# Patient Record
Sex: FEMALE | URBAN - METROPOLITAN AREA
[De-identification: names, ages, dates, MRNs, and addresses within clinical notes are randomized per-mention and may not be internally consistent; named-entity substitution may affect disease eponyms.]

---

## 2019-06-21 ENCOUNTER — APPOINTMENT (RX ONLY)
Dept: URBAN - METROPOLITAN AREA CLINIC 24 | Facility: CLINIC | Age: 64
Setting detail: DERMATOLOGY
End: 2019-06-21

## 2019-06-21 DIAGNOSIS — L57.3 POIKILODERMA OF CIVATTE: ICD-10-CM

## 2019-06-21 DIAGNOSIS — D22 MELANOCYTIC NEVI: ICD-10-CM

## 2019-06-21 DIAGNOSIS — L82.1 OTHER SEBORRHEIC KERATOSIS: ICD-10-CM

## 2019-06-21 DIAGNOSIS — L73.8 OTHER SPECIFIED FOLLICULAR DISORDERS: ICD-10-CM

## 2019-06-21 DIAGNOSIS — F42.4 EXCORIATION (SKIN-PICKING) DISORDER: ICD-10-CM

## 2019-06-21 DIAGNOSIS — R23.3 SPONTANEOUS ECCHYMOSES: ICD-10-CM

## 2019-06-21 DIAGNOSIS — I78.8 OTHER DISEASES OF CAPILLARIES: ICD-10-CM

## 2019-06-21 DIAGNOSIS — L81.4 OTHER MELANIN HYPERPIGMENTATION: ICD-10-CM

## 2019-06-21 DIAGNOSIS — D18.0 HEMANGIOMA: ICD-10-CM

## 2019-06-21 PROBLEM — D23.39 OTHER BENIGN NEOPLASM OF SKIN OF OTHER PARTS OF FACE: Status: ACTIVE | Noted: 2019-06-21

## 2019-06-21 PROBLEM — D22.5 MELANOCYTIC NEVI OF TRUNK: Status: ACTIVE | Noted: 2019-06-21

## 2019-06-21 PROBLEM — D18.01 HEMANGIOMA OF SKIN AND SUBCUTANEOUS TISSUE: Status: ACTIVE | Noted: 2019-06-21

## 2019-06-21 PROBLEM — S80.921A UNSPECIFIED SUPERFICIAL INJURY OF RIGHT LOWER LEG, INITIAL ENCOUNTER: Status: ACTIVE | Noted: 2019-06-21

## 2019-06-21 PROBLEM — D22.62 MELANOCYTIC NEVI OF LEFT UPPER LIMB, INCLUDING SHOULDER: Status: ACTIVE | Noted: 2019-06-21

## 2019-06-21 PROBLEM — F41.9 ANXIETY DISORDER, UNSPECIFIED: Status: ACTIVE | Noted: 2019-06-21

## 2019-06-21 PROBLEM — D48.5 NEOPLASM OF UNCERTAIN BEHAVIOR OF SKIN: Status: ACTIVE | Noted: 2019-06-21

## 2019-06-21 PROCEDURE — 11102 TANGNTL BX SKIN SINGLE LES: CPT

## 2019-06-21 PROCEDURE — 99203 OFFICE O/P NEW LOW 30 MIN: CPT | Mod: 25

## 2019-06-21 PROCEDURE — ? BIOPSY BY SHAVE METHOD

## 2019-06-21 PROCEDURE — ? COUNSELING

## 2019-06-21 PROCEDURE — 11103 TANGNTL BX SKIN EA SEP/ADDL: CPT

## 2019-06-21 ASSESSMENT — LOCATION DETAILED DESCRIPTION DERM
LOCATION DETAILED: LEFT LATERAL MALAR CHEEK
LOCATION DETAILED: RIGHT MEDIAL TRAPEZIAL NECK
LOCATION DETAILED: RIGHT LATERAL ABDOMEN
LOCATION DETAILED: RIGHT SUPERIOR MEDIAL BUCCAL CHEEK
LOCATION DETAILED: LEFT POSTERIOR SHOULDER
LOCATION DETAILED: LEFT MID-UPPER BACK
LOCATION DETAILED: RIGHT INFERIOR UPPER BACK
LOCATION DETAILED: LEFT INFERIOR ANTERIOR NECK
LOCATION DETAILED: LEFT SUPERIOR NASAL CHEEK
LOCATION DETAILED: LEFT SUPERIOR MEDIAL FOREHEAD
LOCATION DETAILED: RIGHT ACHILLES SKIN
LOCATION DETAILED: UPPER STERNUM
LOCATION DETAILED: RIGHT MEDIAL MALAR CHEEK
LOCATION DETAILED: LEFT DISTAL PRETIBIAL REGION
LOCATION DETAILED: RIGHT ANTERIOR DISTAL UPPER ARM
LOCATION DETAILED: LEFT SUPERIOR CENTRAL BUCCAL CHEEK
LOCATION DETAILED: RIGHT ANTERIOR PROXIMAL THIGH
LOCATION DETAILED: RIGHT CENTRAL MALAR CHEEK
LOCATION DETAILED: RIGHT LATERAL SUPERIOR CHEST
LOCATION DETAILED: LEFT DISTAL CALF
LOCATION DETAILED: RIGHT ANTERIOR DISTAL THIGH
LOCATION DETAILED: RIGHT MID-UPPER BACK
LOCATION DETAILED: RIGHT PROXIMAL PRETIBIAL REGION
LOCATION DETAILED: RIGHT POSTERIOR ANKLE
LOCATION DETAILED: LEFT ANTERIOR DISTAL THIGH
LOCATION DETAILED: LEFT SUPERIOR LATERAL UPPER BACK
LOCATION DETAILED: RIGHT INFERIOR LATERAL MIDBACK
LOCATION DETAILED: RIGHT INFERIOR MEDIAL MALAR CHEEK
LOCATION DETAILED: RIGHT SUPERIOR CENTRAL MALAR CHEEK
LOCATION DETAILED: RIGHT SUPERIOR LATERAL UPPER BACK
LOCATION DETAILED: LEFT DISTAL POSTERIOR THIGH
LOCATION DETAILED: LEFT POSTERIOR ANKLE
LOCATION DETAILED: LEFT SUPERIOR UPPER BACK
LOCATION DETAILED: EPIGASTRIC SKIN
LOCATION DETAILED: LEFT INFERIOR CENTRAL MALAR CHEEK
LOCATION DETAILED: LEFT INFERIOR UPPER BACK
LOCATION DETAILED: RIGHT DISTAL CALF
LOCATION DETAILED: LEFT ANTERIOR PROXIMAL THIGH
LOCATION DETAILED: RIGHT DISTAL POSTERIOR THIGH
LOCATION DETAILED: LEFT CENTRAL MALAR CHEEK

## 2019-06-21 ASSESSMENT — LOCATION SIMPLE DESCRIPTION DERM
LOCATION SIMPLE: LEFT FOREHEAD
LOCATION SIMPLE: LEFT THIGH
LOCATION SIMPLE: RIGHT ANKLE
LOCATION SIMPLE: LEFT CHEEK
LOCATION SIMPLE: LEFT UPPER BACK
LOCATION SIMPLE: RIGHT ACHILLES SKIN
LOCATION SIMPLE: RIGHT CHEEK
LOCATION SIMPLE: RIGHT THIGH
LOCATION SIMPLE: RIGHT UPPER BACK
LOCATION SIMPLE: POSTERIOR NECK
LOCATION SIMPLE: RIGHT BACK
LOCATION SIMPLE: LEFT SHOULDER
LOCATION SIMPLE: ABDOMEN
LOCATION SIMPLE: RIGHT UPPER ARM
LOCATION SIMPLE: LEFT CALF
LOCATION SIMPLE: RIGHT POSTERIOR THIGH
LOCATION SIMPLE: RIGHT CALF
LOCATION SIMPLE: LEFT PRETIBIAL REGION
LOCATION SIMPLE: CHEST
LOCATION SIMPLE: LEFT ANKLE
LOCATION SIMPLE: RIGHT LOWER BACK
LOCATION SIMPLE: LEFT POSTERIOR THIGH
LOCATION SIMPLE: RIGHT PRETIBIAL REGION
LOCATION SIMPLE: LEFT ANTERIOR NECK

## 2019-06-21 ASSESSMENT — LOCATION ZONE DERM
LOCATION ZONE: TRUNK
LOCATION ZONE: ARM
LOCATION ZONE: NECK
LOCATION ZONE: LEG
LOCATION ZONE: FACE

## 2019-06-21 NOTE — PROCEDURE: BIOPSY BY SHAVE METHOD
Body Location Override (Optional - Billing Will Still Be Based On Selected Body Map Location If Applicable): right lateral chest
Was A Bandage Applied: Yes
Post-Care Instructions: I reviewed with the patient in detail post-care instructions. Patient is to keep the biopsy site dry overnight, and then apply bacitracin twice daily until healed. Patient may apply hydrogen peroxide soaks to remove any crusting.
Dressing: bandage
Electrodesiccation And Curettage Text: The wound bed was treated with electrodesiccation and curettage after the biopsy was performed.
Anesthesia Volume In Cc: 0.5
Biopsy Type: H and E
Billing Type: Third-Party Bill
Bill For Surgical Tray: no
Notification Instructions: Patient will be notified of biopsy results. However, patient instructed to call the office if not contacted within 2 weeks.
Curettage Text: The wound bed was treated with curettage after the biopsy was performed.
Detail Level: Detailed
Biopsy Method: Dermablade
Hemostasis: Aluminum Chloride
Silver Nitrate Text: The wound bed was treated with silver nitrate after the biopsy was performed.
Lab Facility: [id number]
Additional Anesthesia Volume In Cc (Will Not Render If 0): 0
Cryotherapy Text: The wound bed was treated with cryotherapy after the biopsy was performed.
Wound Care: Vaseline
Anesthesia Type: 1% lidocaine with epinephrine
Consent: Written consent was obtained and risks were reviewed including but not limited to scarring, infection, bleeding, scabbing, incomplete removal, nerve damage and allergy to anesthesia.
Lab: [id number]
Type Of Destruction Used: Curettage
Electrodesiccation Text: The wound bed was treated with electrodesiccation after the biopsy was performed.
Depth Of Biopsy: dermis

## 2019-07-09 ENCOUNTER — APPOINTMENT (RX ONLY)
Dept: URBAN - METROPOLITAN AREA CLINIC 24 | Facility: CLINIC | Age: 64
Setting detail: DERMATOLOGY
End: 2019-07-09

## 2019-07-09 PROBLEM — C44.519 BASAL CELL CARCINOMA OF SKIN OF OTHER PART OF TRUNK: Status: ACTIVE | Noted: 2019-07-09

## 2019-07-09 PROCEDURE — 17262 DSTRJ MAL LES T/A/L 1.1-2.0: CPT

## 2019-07-09 PROCEDURE — ? COUNSELING

## 2019-07-09 PROCEDURE — ? CURETTAGE AND DESTRUCTION

## 2019-07-09 NOTE — PROCEDURE: CURETTAGE AND DESTRUCTION
Add Ability To Document Additional Intralesional Injection: No
Size Of Lesion After Curettage: 1.1
Additional Information: (Optional): The wound was cleaned, and a pressure dressing was applied.  The patient received detailed post-op instructions.
Post-Care Instructions: I reviewed with the patient in detail post-care instructions. Patient is to keep the area dry for 48 hours, and not to engage in any swimming until the area is healed. Should the patient develop any fevers, chills, bleeding, severe pain patient will contact the office immediately.
Bill As A Line Item Or As Units: Line Item
What Was Performed First?: Curettage
Biopsy Photograph Reviewed: Yes
Anesthesia Volume In Cc: 5
Detail Level: Detailed
Total Volume (Ccs): 1
Size Of Lesion In Cm: 0.9
Anesthesia Type: 1% lidocaine with epinephrine
Number Of Curettages: 3
Cautery Type: electrodesiccation
Consent was obtained from the patient. The risks, benefits and alternatives to therapy were discussed in detail. Specifically, the risks of infection, scarring, bleeding, prolonged wound healing, nerve injury, incomplete removal, allergy to anesthesia and recurrence were addressed. Alternatives to ED&C, such as: surgical removal and XRT were also discussed.  Prior to the procedure, the treatment site was clearly identified and confirmed by the patient. All components of Universal Protocol/PAUSE Rule completed.

## 2019-07-18 ENCOUNTER — APPOINTMENT (RX ONLY)
Dept: URBAN - METROPOLITAN AREA CLINIC 5 | Facility: CLINIC | Age: 64
Setting detail: DERMATOLOGY
End: 2019-07-18

## 2019-07-18 PROBLEM — C44.311 BASAL CELL CARCINOMA OF SKIN OF NOSE: Status: ACTIVE | Noted: 2019-07-18

## 2019-07-18 PROCEDURE — 14060 TIS TRNFR E/N/E/L 10 SQ CM/<: CPT | Mod: 79

## 2019-07-18 PROCEDURE — ? MOHS SURGERY

## 2019-07-18 PROCEDURE — 17311 MOHS 1 STAGE H/N/HF/G: CPT | Mod: 79

## 2019-07-18 NOTE — PROCEDURE: MOHS SURGERY
Consent Type: Consent 1 (Standard)
Consent 3/Introductory Paragraph: I gave the patient a chance to ask questions they had about the procedure.  Following this I explained the Mohs procedure and consent was obtained. The risks, benefits and alternatives to therapy were discussed in detail. Specifically, the risks of infection, scarring, bleeding, prolonged wound healing, incomplete removal, allergy to anesthesia, nerve injury and recurrence were addressed. Prior to the procedure, the treatment site was clearly identified and confirmed by the patient.
Show Mohs Case Number Variable: Yes
Quadrant Reporting?: no
Scc In Situ Histology Text: Full thickness squamous atypia noted and marked accordingly on the Mohs map.
Crescentic Advancement Flap Text: The defect edges were debeveled with a #15 scalpel blade.  Given the location of the defect and the proximity to free margins a crescentic advancement flap was deemed most appropriate.  Using a sterile surgical marker, the appropriate advancement flap was drawn incorporating the defect and placing the expected incisions within the relaxed skin tension lines where possible.    The area thus outlined was incised deep to adipose tissue with a #15 scalpel blade.  The skin margins were undermined to an appropriate distance in all directions utilizing iris scissors.
Stage 10: Additional Anesthesia Volume In Cc: 0
Oculoplastic Surgeon Procedure Text (C): After obtaining clear surgical margins the patient was sent to oculoplastics for surgical repair.  The patient understands they will receive post-surgical care and follow-up from the referring physician's office.
S Plasty Text: Given the location and shape of the defect, and the orientation of relaxed skin tension lines, an S-plasty was deemed most appropriate for repair.  Using a sterile surgical marker, the appropriate outline of the S-plasty was drawn, incorporating the defect and placing the expected incisions within the relaxed skin tension lines where possible.  The area thus outlined was incised deep to adipose tissue with a #15 scalpel blade.  The skin margins were undermined to an appropriate distance in all directions utilizing iris scissors. The skin flaps were advanced over the defect.  The opposing margins were then approximated with interrupted buried subcutaneous sutures.
Bcc Superficial Histology Text: Basaloid atypia in a superficial pattern noted and marked accordingly on the Mohs map.
Suturegard Retention Suture: 2-0 Nylon
Stage 2: Additional Anesthesia Type: 1% lidocaine with epinephrine
Helical Rim Advancement Flap Text: The defect edges were debeveled with a #15 blade scalpel.  Given the location of the defect and the proximity to free margins (helical rim) a double helical rim advancement flap was deemed most appropriate.  Using a sterile surgical marker, the appropriate advancement flaps were drawn incorporating the defect and placing the expected incisions between the helical rim and antihelix where possible.  The area thus outlined was incised through and through with a #15 scalpel blade.  With a skin hook and iris scissors, the flaps were gently and sharply undermined and freed up.
Wound Care (No Sutures): Petrolatum
Stage 1: Number Of Blocks?: 1
Dermal Autograft Text: The defect edges were debeveled with a #15 scalpel blade.  Given the location of the defect, shape of the defect and the proximity to free margins a dermal autograft was deemed most appropriate.  Using a sterile surgical marker, the primary defect shape was transferred to the donor site. The area thus outlined was incised deep to adipose tissue with a #15 scalpel blade.  The harvested graft was then trimmed of adipose and epidermal tissue until only dermis was left.  The skin graft was then placed in the primary defect and oriented appropriately.
Initial Size Of Lesion: 0.7
Lazy S Intermediate Repair Preamble Text (Leave Blank If You Do Not Want): Undermining was performed with blunt dissection.
Provider Procedure Text (F): After obtaining clear surgical margins the defect was repaired by another provider.
Consent (Near Eyelid Margin)/Introductory Paragraph: The rationale for Mohs was explained to the patient and consent was obtained. The risks, benefits and alternatives to therapy were discussed in detail. Specifically, the risks of ectropion or eyelid deformity, infection, scarring, bleeding, prolonged wound healing, incomplete removal, allergy to anesthesia, nerve injury and recurrence were addressed. Prior to the procedure, the treatment site was clearly identified and confirmed by the patient.
Same Histology In Subsequent Stages Text: The pattern and morphology of the tumor is as described in the first stage.
O-Z Flap Text: The defect edges were debeveled with a #15 scalpel blade.  Given the location of the defect, shape of the defect and the proximity to free margins an O-Z flap was deemed most appropriate.  Using a sterile surgical marker, an appropriate transposition flap was drawn incorporating the defect and placing the expected incisions within the relaxed skin tension lines where possible. The area thus outlined was incised deep to adipose tissue with a #15 scalpel blade.  The skin margins were undermined to an appropriate distance in all directions utilizing iris scissors.
Mid-Level Procedure Text (C): After obtaining clear surgical margins the patient was sent to a mid-level provider for surgical repair.  The patient understands they will receive post-surgical care and follow-up from the mid-level provider.
Z Plasty Text: The lesion was extirpated to the level of the fat with a #15 scalpel blade.  Given the location of the defect, shape of the defect and the proximity to free margins a Z-plasty was deemed most appropriate for repair.  Using a sterile surgical marker, the appropriate transposition arms of the Z-plasty were drawn incorporating the defect and placing the expected incisions within the relaxed skin tension lines where possible.    The area thus outlined was incised deep to adipose tissue with a #15 scalpel blade.  The skin margins were undermined to an appropriate distance in all directions utilizing iris scissors.  The opposing transposition arms were then transposed into place in opposite direction and anchored with interrupted buried subcutaneous sutures.
Suture Removal: 7 days
Length To Time In Minutes Device Was In Place: 10
Graft Cartilage Fenestration Text: The cartilage was fenestrated with a 2mm punch biopsy to help facilitate graft survival and healing.
Bilobed Flap Text: The defect edges were debeveled with a #15 scalpel blade.  Given the location of the defect and the proximity to free margins a bilobe flap was deemed most appropriate.  Using a sterile surgical marker, an appropriate bilobe flap drawn around the defect.    The area thus outlined was incised deep to adipose tissue with a #15 scalpel blade.  The skin margins were undermined to an appropriate distance in all directions utilizing iris scissors.
Primary Defect Width In Cm (Final Defect Size - Required For Flaps/Grafts): 0.9
Purse String (Simple) Text: Given the location of the defect and the characteristics of the surrounding skin a pursestring closure was deemed most appropriate.  Undermining was performed circumfirentially around the surgical defect.  A purstring suture was then placed and tightened.
Mercedes Flap Text: The defect edges were debeveled with a #15 scalpel blade.  Given the location of the defect, shape of the defect and the proximity to free margins a Mercedes flap was deemed most appropriate.  Using a sterile surgical marker, an appropriate advancement flap was drawn incorporating the defect and placing the expected incisions within the relaxed skin tension lines where possible. The area thus outlined was incised deep to adipose tissue with a #15 scalpel blade.  The skin margins were undermined to an appropriate distance in all directions utilizing iris scissors.
Area L Indication Text: Tumors in this location are included in Area L (trunk and extremities).  Mohs surgery is indicated for larger tumors, or tumors with aggressive histologic features, in these anatomic locations.
Melolabial Interpolation Flap Text: A decision was made to reconstruct the defect utilizing an interpolation axial flap and a staged reconstruction.  A telfa template was made of the defect.  This telfa template was then used to outline the melolabial interpolation flap.  The donor area for the pedicle flap was then injected with anesthesia.  The flap was excised through the skin and subcutaneous tissue down to the layer of the underlying musculature.  The pedicle flap was carefully excised within this deep plane to maintain its blood supply.  The edges of the donor site were undermined.   The donor site was closed in a primary fashion.  The pedicle was then rotated into position and sutured.  Once the tube was sutured into place, adequate blood supply was confirmed with blanching and refill.  The pedicle was then wrapped with xeroform gauze and dressed appropriately with a telfa and gauze bandage to ensure continued blood supply and protect the attached pedicle.
Consent (Scalp)/Introductory Paragraph: The rationale for Mohs was explained to the patient and consent was obtained. The risks, benefits and alternatives to therapy were discussed in detail. Specifically, the risks of changes in hair growth pattern secondary to repair, infection, scarring, bleeding, prolonged wound healing, incomplete removal, allergy to anesthesia, nerve injury and recurrence were addressed. Prior to the procedure, the treatment site was clearly identified and confirmed by the patient.
Mohs Histo Method Verbiage: Each section was then chromacoded and processed in the Mohs lab using the Mohs protocol and submitted for frozen section.
Mastoid Interpolation Flap Text: A decision was made to reconstruct the defect utilizing an interpolation axial flap and a staged reconstruction.  A telfa template was made of the defect.  This telfa template was then used to outline the mastoid interpolation flap.  The donor area for the pedicle flap was then injected with anesthesia.  The flap was excised through the skin and subcutaneous tissue down to the layer of the underlying musculature.  The pedicle flap was carefully excised within this deep plane to maintain its blood supply.  The edges of the donor site were undermined.   The donor site was closed in a primary fashion.  The pedicle was then rotated into position and sutured.  Once the tube was sutured into place, adequate blood supply was confirmed with blanching and refill.  The pedicle was then wrapped with xeroform gauze and dressed appropriately with a telfa and gauze bandage to ensure continued blood supply and protect the attached pedicle.
Modified Advancement Flap Text: The defect edges were debeveled with a #15 scalpel blade.  Given the location of the defect, shape of the defect and the proximity to free margins a modified advancement flap was deemed most appropriate.  Using a sterile surgical marker, an appropriate advancement flap was drawn incorporating the defect and placing the expected incisions within the relaxed skin tension lines where possible.    The area thus outlined was incised deep to adipose tissue with a #15 scalpel blade.  The skin margins were undermined to an appropriate distance in all directions utilizing iris scissors.
Referred To Plastics For Closure Text (Leave Blank If You Do Not Want): After obtaining clear surgical margins the patient was sent to plastics for surgical repair.  The patient understands they will receive post-surgical care and follow-up from the referring physician's office.
Scc Well Differentiated Histology Text: Well-differentiated squamous atypia noted and marked accordingly on the Mohs map.
Epidermal Closure Graft Donor Site (Optional): simple interrupted
Island Pedicle Flap With Canthal Suspension Text: The defect edges were debeveled with a #15 scalpel blade.  Given the location of the defect, shape of the defect and the proximity to free margins an island pedicle advancement flap was deemed most appropriate.  Using a sterile surgical marker, an appropriate advancement flap was drawn incorporating the defect, outlining the appropriate donor tissue and placing the expected incisions within the relaxed skin tension lines where possible. The area thus outlined was incised deep to adipose tissue with a #15 scalpel blade.  The skin margins were undermined to an appropriate distance in all directions around the primary defect and laterally outward around the island pedicle utilizing iris scissors.  There was minimal undermining beneath the pedicle flap. A suspension suture was placed in the canthal tendon to prevent tension and prevent ectropion.
Tarsorrhaphy Text: A tarsorrhaphy was performed using Frost sutures.
Spiral Flap Text: The defect edges were debeveled with a #15 scalpel blade.  Given the location of the defect, shape of the defect and the proximity to free margins a spiral flap was deemed most appropriate.  Using a sterile surgical marker, an appropriate rotation flap was drawn incorporating the defect and placing the expected incisions within the relaxed skin tension lines where possible. The area thus outlined was incised deep to adipose tissue with a #15 scalpel blade.  The skin margins were undermined to an appropriate distance in all directions utilizing iris scissors.
Bcc Infiltrative Histology Text: There were numerous aggregates of basaloid cells demonstrating an infiltrative pattern.
Asc Procedure Text (A): After obtaining clear surgical margins the patient was sent to an ASC for surgical repair.  The patient understands they will receive post-surgical care and follow-up from the ASC physician.
Cheiloplasty (Complex) Text: A decision was made to reconstruct the defect with a  cheiloplasty.  The defect was undermined extensively.  Additional obicularis oris muscle was excised with a 15 blade scalpel.  The defect was converted into a full thickness wedge to facilite a better cosmetic result.  Small vessels were then tied off with 5-0 monocyrl. The obicularis oris, superficial fascia, adipose and dermis were then reapproximated.  After the deeper layers were approximated the epidermis was reapproximated with particular care given to realign the vermillion border.
Otolaryngologist Procedure Text (B): After obtaining clear surgical margins the patient was sent to otolaryngology for surgical repair.  The patient understands they will receive post-surgical care and follow-up from the referring physician's office.
Mohs Rapid Report Verbiage: The area of clinically evident tumor was marked with skin marking ink and appropriately hatched.  The initial incision was made following the Mohs approach through the skin.  The specimen was taken to the lab, divided into the necessary number of pieces, chromacoded and processed according to the Mohs protocol.  This was repeated in successive stages until a tumor free defect was achieved.
Medical Necessity Statement: Based on my medical judgement, Mohs surgery is the most appropriate treatment for this cancer compared to other treatments.
Secondary Defect Length In Cm (Required For Flaps): 2
Keystone Flap Text: The defect edges were debeveled with a #15 scalpel blade.  Given the location of the defect, shape of the defect a keystone flap was deemed most appropriate.  Using a sterile surgical marker, an appropriate keystone flap was drawn incorporating the defect, outlining the appropriate donor tissue and placing the expected incisions within the relaxed skin tension lines where possible. The area thus outlined was incised deep to adipose tissue with a #15 scalpel blade.  The skin margins were undermined to an appropriate distance in all directions around the primary defect and laterally outward around the flap utilizing iris scissors.
Previous Accession (Optional): [id number]
Advancement Flap (Single) Text: The defect edges were debeveled with a #15 scalpel blade.  Given the location of the defect and the proximity to free margins a single advancement flap was deemed most appropriate.  Using a sterile surgical marker, an appropriate advancement flap was drawn incorporating the defect and placing the expected incisions within the relaxed skin tension lines where possible.    The area thus outlined was incised deep to adipose tissue with a #15 scalpel blade.  The skin margins were undermined to an appropriate distance in all directions utilizing iris scissors.
Split-Thickness Skin Graft Text: The defect edges were debeveled with a #15 scalpel blade.  Given the location of the defect, shape of the defect and the proximity to free margins a split thickness skin graft was deemed most appropriate.  Using a sterile surgical marker, the primary defect shape was transferred to the donor site. The split thickness graft was then harvested.  The skin graft was then placed in the primary defect and oriented appropriately.
Suturegard Intro: Intraoperative tissue expansion was performed, utilizing the SUTUREGARD device, in order to reduce wound tension.
Manual Repair Warning Statement: We plan on removing the manually selected variable below in favor of our much easier automatic structured text blocks found in the previous tab. We decided to do this to help make the flow better and give you the full power of structured data. Manual selection is never going to be ideal in our platform and I would encourage you to avoid using manual selection from this point on, especially since I will be sunsetting this feature. It is important that you do one of two things with the customized text below. First, you can save all of the text in a word file so you can have it for future reference. Second, transfer the text to the appropriate area in the Library tab. Lastly, if there is a flap or graft type which we do not have you need to let us know right away so I can add it in before the variable is hidden. No need to panic, we plan to give you roughly 6 months to make the change.
Lazy S Complex Repair Preamble Text (Leave Blank If You Do Not Want): Extensive wide undermining was performed.
Melolabial Transposition Flap Text: The defect edges were debeveled with a #15 scalpel blade.  Given the location of the defect and the proximity to free margins a melolabial flap was deemed most appropriate.  Using a sterile surgical marker, an appropriate melolabial transposition flap was drawn incorporating the defect.    The area thus outlined was incised deep to adipose tissue with a #15 scalpel blade.  The skin margins were undermined to an appropriate distance in all directions utilizing iris scissors.
Suturegard Body: The suture ends were repeatedly re-tightened and re-clamped to achieve the desired tissue expansion.
Rhombic Flap Text: The defect edges were debeveled with a #15 scalpel blade.  Given the location of the defect and the proximity to free margins a rhombic flap was deemed most appropriate.  Using a sterile surgical marker, an appropriate rhombic flap was drawn incorporating the defect.    The area thus outlined was incised deep to adipose tissue with a #15 scalpel blade.  The skin margins were undermined to an appropriate distance in all directions utilizing iris scissors.
Cartilage Graft Text: The defect edges were debeveled with a #15 scalpel blade.  Given the location of the defect, shape of the defect, the fact the defect involved a full thickness cartilage defect a cartilage graft was deemed most appropriate.  An appropriate donor site was identified, cleansed, and anesthetized. The cartilage graft was then harvested and transferred to the recipient site, oriented appropriately and then sutured into place.  The secondary defect was then repaired using a primary closure.
A-T Advancement Flap Text: The defect edges were debeveled with a #15 scalpel blade.  Given the location of the defect, shape of the defect and the proximity to free margins an A-T advancement flap was deemed most appropriate.  Using a sterile surgical marker, an appropriate advancement flap was drawn incorporating the defect and placing the expected incisions within the relaxed skin tension lines where possible.    The area thus outlined was incised deep to adipose tissue with a #15 scalpel blade.  The skin margins were undermined to an appropriate distance in all directions utilizing iris scissors.
V-Y Plasty Text: The defect edges were debeveled with a #15 scalpel blade.  Given the location of the defect, shape of the defect and the proximity to free margins an V-Y advancement flap was deemed most appropriate.  Using a sterile surgical marker, an appropriate advancement flap was drawn incorporating the defect and placing the expected incisions within the relaxed skin tension lines where possible.    The area thus outlined was incised deep to adipose tissue with a #15 scalpel blade.  The skin margins were undermined to an appropriate distance in all directions utilizing iris scissors.
Consent (Temporal Branch)/Introductory Paragraph: The rationale for Mohs was explained to the patient and consent was obtained. The risks, benefits and alternatives to therapy were discussed in detail. Specifically, the risks of damage to the temporal branch of the facial nerve, infection, scarring, bleeding, prolonged wound healing, incomplete removal, allergy to anesthesia, and recurrence were addressed. Prior to the procedure, the treatment site was clearly identified and confirmed by the patient.
Bcc Superficial Pigmented Histology Text: Basaloid atypia in a superficial pattern with pigmentation noted and marked accordingly on the Mohs map.
Bilateral Helical Rim Advancement Flap Text: The defect edges were debeveled with a #15 blade scalpel.  Given the location of the defect and the proximity to free margins (helical rim) a bilateral helical rim advancement flap was deemed most appropriate.  Using a sterile surgical marker, the appropriate advancement flaps were drawn incorporating the defect and placing the expected incisions between the helical rim and antihelix where possible.  The area thus outlined was incised through and through with a #15 scalpel blade.  With a skin hook and iris scissors, the flaps were gently and sharply undermined and freed up.
Skin Substitute Text: The defect edges were debeveled with a #15 scalpel blade.  Given the location of the defect, shape of the defect and the proximity to free margins a skin substitute graft was deemed most appropriate.  The graft material was trimmed to fit the size of the defect. The graft was then placed in the primary defect and oriented appropriately.
X Size Of Lesion In Cm (Optional): 0.8
Basosquamous Cell Carcinoma Histology Text: Cellular atypia with basosquamous differentiation noted and marked accordingly on the Mohs map.
Consent (Ear)/Introductory Paragraph: The rationale for Mohs was explained to the patient and consent was obtained. The risks, benefits and alternatives to therapy were discussed in detail. Specifically, the risks of ear deformity, infection, scarring, bleeding, prolonged wound healing, incomplete removal, allergy to anesthesia, nerve injury and recurrence were addressed. Prior to the procedure, the treatment site was clearly identified and confirmed by the patient.
Eye Protection Verbiage: Before proceeding with the stage, a plastic scleral shield was inserted. The globe was anesthetized with a few drops of 1% lidocaine with 1:100,000 epinephrine. Then, an appropriate sized scleral shield was chosen and coated with lacrilube ointment. The shield was gently inserted and left in place for the duration of each stage. After the stage was completed, the shield was gently removed.
No Residual Tumor Seen Histology Text: There were no malignant cells seen in the sections examined.
Double O-Z Flap Text: The defect edges were debeveled with a #15 scalpel blade.  Given the location of the defect, shape of the defect and the proximity to free margins a Double O-Z flap was deemed most appropriate.  Using a sterile surgical marker, an appropriate transposition flap was drawn incorporating the defect and placing the expected incisions within the relaxed skin tension lines where possible. The area thus outlined was incised deep to adipose tissue with a #15 scalpel blade.  The skin margins were undermined to an appropriate distance in all directions utilizing iris scissors.
Cheek Interpolation Flap Text: A decision was made to reconstruct the defect utilizing an interpolation axial flap and a staged reconstruction.  A telfa template was made of the defect.  This telfa template was then used to outline the Cheek Interpolation flap.  The donor area for the pedicle flap was then injected with anesthesia.  The flap was excised through the skin and subcutaneous tissue down to the layer of the underlying musculature.  The interpolation flap was carefully excised within this deep plane to maintain its blood supply.  The edges of the donor site were undermined.   The donor site was closed in a primary fashion.  The pedicle was then rotated into position and sutured.  Once the tube was sutured into place, adequate blood supply was confirmed with blanching and refill.  The pedicle was then wrapped with xeroform gauze and dressed appropriately with a telfa and gauze bandage to ensure continued blood supply and protect the attached pedicle.
Epidermal Sutures: 6-0 Nylon
Secondary Intention Text (Leave Blank If You Do Not Want): The defect will heal with secondary intention.
Bilobed Transposition Flap Text: The defect edges were debeveled with a #15 scalpel blade.  Given the location of the defect and the proximity to free margins a bilobed transposition flap was deemed most appropriate.  Using a sterile surgical marker, an appropriate bilobe flap drawn around the defect.    The area thus outlined was incised deep to adipose tissue with a #15 scalpel blade.  The skin margins were undermined to an appropriate distance in all directions utilizing iris scissors.
Repair Type: Flap
Incidental Superficial Basal Cell Carcinoma Histology Text: basaloid atypia in a superficial pattern was noted and marked accordingly on the Mohs map.
Detail Level: Detailed
Purse String (Intermediate) Text: Given the location of the defect and the characteristics of the surrounding skin a pursestring intermediate closure was deemed most appropriate.  Undermining was performed circumfirentially around the surgical defect.  A purstring suture was then placed and tightened.
Incidental Squamous Cell Carcinoma In Situ Histology Text: full thickness squamous atypia was noted and marked accordingly on the Mohs map.
Anesthesia Volume In Cc: 7
Mucosal Advancement Flap Text: Given the location of the defect, shape of the defect and the proximity to free margins a mucosal advancement flap was deemed most appropriate. Incisions were made with a 15 blade scalpel in the appropriate fashion along the cutaneous vermillion border and the mucosal lip. The remaining actinically damaged mucosal tissue was excised.  The mucosal advancement flap was then elevated to the gingival sulcus with care taken to preserve the neurovascular structures and advanced into the primary defect. Care was taken to ensure that precise realignment of the vermillion border was achieved.
Hemostasis: Electrocautery
Posterior Auricular Interpolation Flap Text: A decision was made to reconstruct the defect utilizing an interpolation axial flap and a staged reconstruction.  A telfa template was made of the defect.  This telfa template was then used to outline the posterior auricular interpolation flap.  The donor area for the pedicle flap was then injected with anesthesia.  The flap was excised through the skin and subcutaneous tissue down to the layer of the underlying musculature.  The pedicle flap was carefully excised within this deep plane to maintain its blood supply.  The edges of the donor site were undermined.   The donor site was closed in a primary fashion.  The pedicle was then rotated into position and sutured.  Once the tube was sutured into place, adequate blood supply was confirmed with blanching and refill.  The pedicle was then wrapped with xeroform gauze and dressed appropriately with a telfa and gauze bandage to ensure continued blood supply and protect the attached pedicle.
Unna Boot Text: An Unna boot was placed to help immobilize the limb and facilitate more rapid healing.
Scc Moderately Differentiated Histology Text: Squamous atypia with moderate differentiation noted and marked accordingly on the Mohs map.
Alar Island Pedicle Flap Text: The defect edges were debeveled with a #15 scalpel blade.  Given the location of the defect, shape of the defect and the proximity to the alar rim an island pedicle advancement flap was deemed most appropriate.  Using a sterile surgical marker, an appropriate advancement flap was drawn incorporating the defect, outlining the appropriate donor tissue and placing the expected incisions within the nasal ala running parallel to the alar rim. The area thus outlined was incised with a #15 scalpel blade.  The skin margins were undermined minimally to an appropriate distance in all directions around the primary defect and laterally outward around the island pedicle utilizing iris scissors.  There was minimal undermining beneath the pedicle flap.
Complex Repair And Flap Additional Text (Will Appearing After The Standard Complex Repair Text): The complex repair was not sufficient to completely close the primary defect. The remaining additional defect was repaired with the flap mentioned below.
Ear Wedge Repair Text: A wedge excision was completed by carrying down an excision through the full thickness of the ear and cartilage with an inward facing Burow's triangle. The wound was then closed in a layered fashion.
Star Wedge Flap Text: The defect edges were debeveled with a #15 scalpel blade.  Given the location of the defect, shape of the defect and the proximity to free margins a star wedge flap was deemed most appropriate.  Using a sterile surgical marker, an appropriate rotation flap was drawn incorporating the defect and placing the expected incisions within the relaxed skin tension lines where possible. The area thus outlined was incised deep to adipose tissue with a #15 scalpel blade.  The skin margins were undermined to an appropriate distance in all directions utilizing iris scissors.
Alternatives Discussed Intro (Do Not Add Period): I discussed alternative treatments to Mohs surgery and specifically discussed the risks and benefits of
O-T Plasty Text: The defect edges were debeveled with a #15 scalpel blade.  Given the location of the defect, shape of the defect and the proximity to free margins an O-T plasty was deemed most appropriate.  Using a sterile surgical marker, an appropriate O-T plasty was drawn incorporating the defect and placing the expected incisions within the relaxed skin tension lines where possible.    The area thus outlined was incised deep to adipose tissue with a #15 scalpel blade.  The skin margins were undermined to an appropriate distance in all directions utilizing iris scissors.
Secondary Defect Width In Cm (Required For Flaps): 1.2
Surgeon: Max
Advancement Flap (Double) Text: The defect edges were debeveled with a #15 scalpel blade.  Given the location of the defect and the proximity to free margins a double advancement flap was deemed most appropriate.  Using a sterile surgical marker, the appropriate advancement flaps were drawn incorporating the defect and placing the expected incisions within the relaxed skin tension lines where possible.    The area thus outlined was incised deep to adipose tissue with a #15 scalpel blade.  The skin margins were undermined to an appropriate distance in all directions utilizing iris scissors.
Bcc  Nodular Histology Text: Basaloid atypia in a nodular pattern noted and marked accordingly on the Mohs map.
Closure 2 Information: This tab is for additional flaps and grafts, including complex repair and grafts and complex repair and flaps. You can also specify a different location for the additional defect, if the location is the same you do not need to select a new one. We will insert the automated text for the repair you select below just as we do for solitary flaps and grafts. Please note that at this time if you select a location with a different insurance zone you will need to override the ICD10 and CPT if appropriate.
Dressing: pressure dressing with telfa
Rhomboid Transposition Flap Text: The defect edges were debeveled with a #15 scalpel blade.  Given the location of the defect and the proximity to free margins a rhomboid transposition flap was deemed most appropriate.  Using a sterile surgical marker, an appropriate rhomboid flap was drawn incorporating the defect.    The area thus outlined was incised deep to adipose tissue with a #15 scalpel blade.  The skin margins were undermined to an appropriate distance in all directions utilizing iris scissors.
Composite Graft Text: The defect edges were debeveled with a #15 scalpel blade.  Given the location of the defect, shape of the defect, the proximity to free margins and the fact the defect was full thickness a composite graft was deemed most appropriate.  The defect was outline and then transferred to the donor site.  A full thickness graft was then excised from the donor site. The graft was then placed in the primary defect, oriented appropriately and then sutured into place.  The secondary defect was then repaired using a primary closure.
Donor Site Anesthesia Type: same as repair anesthesia
O-T Advancement Flap Text: The defect edges were debeveled with a #15 scalpel blade.  Given the location of the defect, shape of the defect and the proximity to free margins an O-T advancement flap was deemed most appropriate.  Using a sterile surgical marker, an appropriate advancement flap was drawn incorporating the defect and placing the expected incisions within the relaxed skin tension lines where possible.    The area thus outlined was incised deep to adipose tissue with a #15 scalpel blade.  The skin margins were undermined to an appropriate distance in all directions utilizing iris scissors.
H Plasty Text: Given the location of the defect, shape of the defect and the proximity to free margins a H-plasty was deemed most appropriate for repair.  Using a sterile surgical marker, the appropriate advancement arms of the H-plasty were drawn incorporating the defect and placing the expected incisions within the relaxed skin tension lines where possible. The area thus outlined was incised deep to adipose tissue with a #15 scalpel blade. The skin margins were undermined to an appropriate distance in all directions utilizing iris scissors.  The opposing advancement arms were then advanced into place in opposite direction and anchored with interrupted buried subcutaneous sutures.
Consent (Marginal Mandibular)/Introductory Paragraph: The rationale for Mohs was explained to the patient and consent was obtained. The risks, benefits and alternatives to therapy were discussed in detail. Specifically, the risks of damage to the marginal mandibular branch of the facial nerve, infection, scarring, bleeding, prolonged wound healing, incomplete removal, allergy to anesthesia, and recurrence were addressed. Prior to the procedure, the treatment site was clearly identified and confirmed by the patient.
Mixed Superficial And Nodular Bcc Histology Text: Basaloid atypia in a superficial and nodular pattern noted and marked accordingly on the Mohs map.
Dressing (No Sutures): dry sterile dressing
Retention Suture Bite Size: 3 mm
Tissue Cultured Epidermal Autograft Text: The defect edges were debeveled with a #15 scalpel blade.  Given the location of the defect, shape of the defect and the proximity to free margins a tissue cultured epidermal autograft was deemed most appropriate.  The graft was then trimmed to fit the size of the defect.  The graft was then placed in the primary defect and oriented appropriately.
Repair Anesthesia Method: local infiltration
Inflammation Suggestive Of Cancer Camouflage Histology Text: There was a dense lymphocytic infiltrate which prevented adequate histologic evaluation of adjacent structures.
Ear Star Wedge Flap Text: The defect edges were debeveled with a #15 blade scalpel.  Given the location of the defect and the proximity to free margins (helical rim) an ear star wedge flap was deemed most appropriate.  Using a sterile surgical marker, the appropriate flap was drawn incorporating the defect and placing the expected incisions between the helical rim and antihelix where possible.  The area thus outlined was incised through and through with a #15 scalpel blade.
Consent (Nose)/Introductory Paragraph: The rationale for Mohs was explained to the patient and consent was obtained. The risks, benefits and alternatives to therapy were discussed in detail. Specifically, the risks of nasal deformity, changes in the flow of air through the nose, infection, scarring, bleeding, prolonged wound healing, incomplete removal, allergy to anesthesia, nerve injury and recurrence were addressed. Prior to the procedure, the treatment site was clearly identified and confirmed by the patient.
Mohs Method Verbiage: An incision at a 45 degree angle following the standard Mohs approach was done and the specimen was harvested as a microscopic controlled layer.
V-Y Flap Text: The defect edges were debeveled with a #15 scalpel blade.  Given the location of the defect, shape of the defect and the proximity to free margins a V-Y flap was deemed most appropriate.  Using a sterile surgical marker, an appropriate advancement flap was drawn incorporating the defect and placing the expected incisions within the relaxed skin tension lines where possible.    The area thus outlined was incised deep to adipose tissue with a #15 scalpel blade.  The skin margins were undermined to an appropriate distance in all directions utilizing iris scissors.
Pain Refusal Text: I offered to prescribe pain medication but the patient refused to take this medication.
Closure 3 Information: This tab is for additional flaps and grafts above and beyond our usual structured repairs.  Please note if you enter information here it will not currently bill and you will need to add the billing information manually.
Anesthesia Volume In Cc: 6
Cheek-To-Nose Interpolation Flap Text: A decision was made to reconstruct the defect utilizing an interpolation axial flap and a staged reconstruction.  A telfa template was made of the defect.  This telfa template was then used to outline the Cheek-To-Nose Interpolation flap.  The donor area for the pedicle flap was then injected with anesthesia.  The flap was excised through the skin and subcutaneous tissue down to the layer of the underlying musculature.  The interpolation flap was carefully excised within this deep plane to maintain its blood supply.  The edges of the donor site were undermined.   The donor site was closed in a primary fashion.  The pedicle was then rotated into position and sutured.  Once the tube was sutured into place, adequate blood supply was confirmed with blanching and refill.  The pedicle was then wrapped with xeroform gauze and dressed appropriately with a telfa and gauze bandage to ensure continued blood supply and protect the attached pedicle.
Area H Indication Text: Tumors in this location are included in Area H (eyelids, eyebrows, nose, lips, chin, ear, pre-auricular, post-auricular, temple, genitalia, hands, feet, ankles and areola).  Tissue conservation is critical in these anatomic locations.
Trilobed Flap Text: The defect edges were debeveled with a #15 scalpel blade.  Given the location of the defect and the proximity to free margins a trilobed flap was deemed most appropriate.  Using a sterile surgical marker, an appropriate trilobed flap drawn around the defect.    The area thus outlined was incised deep to adipose tissue with a #15 scalpel blade.  The skin margins were undermined to an appropriate distance in all directions utilizing iris scissors.
Partial Purse String (Simple) Text: Given the location of the defect and the characteristics of the surrounding skin a simple purse string closure was deemed most appropriate.  Undermining was performed circumfirentially around the surgical defect.  A purse string suture was then placed and tightened. Wound tension only allowed a partial closure of the circular defect.
No Repair - Repaired With Adjacent Surgical Defect Text (Leave Blank If You Do Not Want): After obtaining clear surgical margins the defect was repaired concurrently with another surgical defect which was in close approximation.
Partial Purse String (Intermediate) Text: Given the location of the defect and the characteristics of the surrounding skin an intermediate purse string closure was deemed most appropriate.  Undermining was performed circumfirentially around the surgical defect.  A purse string suture was then placed and tightened. Wound tension only allowed a partial closure of the circular defect.
Dorsal Nasal Flap Text: The defect edges were debeveled with a #15 scalpel blade.  Given the location of the defect and the proximity to free margins a dorsal nasal flap was deemed most appropriate.  Using a sterile surgical marker, an appropriate dorsal nasal flap was drawn around the defect.    The area thus outlined was incised deep to adipose tissue with a #15 scalpel blade.  The skin margins were undermined to an appropriate distance in all directions utilizing iris scissors.
Incidental Actinic Keratosis Histology Text: actinic damage was noted in the histology.
Epidermal Closure: running subcuticular
Hatchet Flap Text: The defect edges were debeveled with a #15 scalpel blade.  Given the location of the defect, shape of the defect and the proximity to free margins a hatchet flap was deemed most appropriate.  Using a sterile surgical marker, an appropriate hatchet flap was drawn incorporating the defect and placing the expected incisions within the relaxed skin tension lines where possible.    The area thus outlined was incised deep to adipose tissue with a #15 scalpel blade.  The skin margins were undermined to an appropriate distance in all directions utilizing iris scissors.
Paramedian Forehead Flap Text: A decision was made to reconstruct the defect utilizing an interpolation axial flap and a staged reconstruction.  A telfa template was made of the defect.  This telfa template was then used to outline the paramedian forehead pedicle flap.  The donor area for the pedicle flap was then injected with anesthesia.  The flap was excised through the skin and subcutaneous tissue down to the layer of the underlying musculature.  The pedicle flap was carefully excised within this deep plane to maintain its blood supply.  The edges of the donor site were undermined.   The donor site was closed in a primary fashion.  The pedicle was then rotated into position and sutured.  Once the tube was sutured into place, adequate blood supply was confirmed with blanching and refill.  The pedicle was then wrapped with xeroform gauze and dressed appropriately with a telfa and gauze bandage to ensure continued blood supply and protect the attached pedicle.
Mohs Case Number: [id number]
Double Island Pedicle Flap Text: The defect edges were debeveled with a #15 scalpel blade.  Given the location of the defect, shape of the defect and the proximity to free margins a double island pedicle advancement flap was deemed most appropriate.  Using a sterile surgical marker, an appropriate advancement flap was drawn incorporating the defect, outlining the appropriate donor tissue and placing the expected incisions within the relaxed skin tension lines where possible.    The area thus outlined was incised deep to adipose tissue with a #15 scalpel blade.  The skin margins were undermined to an appropriate distance in all directions around the primary defect and laterally outward around the island pedicle utilizing iris scissors.  There was minimal undermining beneath the pedicle flap.
Scc Poorly Differentiated Histology Text: Squamous atypia with poor differentiation noted and marked accordingly on the Mohs map.
Flap Type: Transposition Flap
Home Suture Removal Text: Patient was provided instructions on removing sutures and will remove their sutures at home.  If they have any questions or difficulties they will call the office.
Location Indication Override (Is Already Calculated Based On Selected Body Location): Area H
Graft Donor Site Bandage (Optional-Leave Blank If You Don't Want In Note): Steri-strips and a pressure bandage were applied to the donor site.
Complex Repair And Graft Additional Text (Will Appearing After The Standard Complex Repair Text): The complex repair was not sufficient to completely close the primary defect. The remaining additional defect was repaired with the graft mentioned below.
Transposition Flap Text: The defect edges were debeveled with a #15 scalpel blade.  Given the location of the defect and the proximity to free margins a transposition flap was deemed most appropriate.  Using a sterile surgical marker, an appropriate transposition flap was drawn incorporating the defect.    The area thus outlined was incised deep to adipose tissue with a #15 scalpel blade.  The skin margins were undermined to an appropriate distance in all directions utilizing iris scissors.
Full Thickness Lip Wedge Repair (Flap) Text: Given the location of the defect and the proximity to free margins a full thickness wedge repair was deemed most appropriate.  Using a sterile surgical marker, the appropriate repair was drawn incorporating the defect and placing the expected incisions perpendicular to the vermillion border.  The vermillion border was also meticulously outlined to ensure appropriate reapproximation during the repair.  The area thus outlined was incised through and through with a #15 scalpel blade.  The muscularis and dermis were reaproximated with deep sutures following hemostasis. Care was taken to realign the vermillion border before proceeding with the superficial closure.  Once the vermillion was realigned the superfical and mucosal closure was finished.
Consent 1/Introductory Paragraph: The rationale for Mohs was explained to the patient and consent was obtained. The risks, benefits and alternatives to therapy were discussed in detail. Specifically, the risks of infection, scarring, bleeding, prolonged wound healing, incomplete removal, allergy to anesthesia, nerve injury and recurrence were addressed. Prior to the procedure, the treatment site was clearly identified and confirmed by the patient.
Deep Sutures: 5-0 Maxon
Burow's Advancement Flap Text: The defect edges were debeveled with a #15 scalpel blade.  Given the location of the defect and the proximity to free margins a Burow's advancement flap was deemed most appropriate.  Using a sterile surgical marker, the appropriate advancement flap was drawn incorporating the defect and placing the expected incisions within the relaxed skin tension lines where possible.    The area thus outlined was incised deep to adipose tissue with a #15 scalpel blade.  The skin margins were undermined to an appropriate distance in all directions utilizing iris scissors.
O-Z Plasty Text: The defect edges were debeveled with a #15 scalpel blade.  Given the location of the defect, shape of the defect and the proximity to free margins an O-Z plasty (double transposition flap) was deemed most appropriate.  Using a sterile surgical marker, the appropriate transposition flaps were drawn incorporating the defect and placing the expected incisions within the relaxed skin tension lines where possible.    The area thus outlined was incised deep to adipose tissue with a #15 scalpel blade.  The skin margins were undermined to an appropriate distance in all directions utilizing iris scissors.  Hemostasis was achieved with electrocautery.  The flaps were then transposed into place, one clockwise and the other counterclockwise, and anchored with interrupted buried subcutaneous sutures.
Chonodrocutaneous Helical Advancement Flap Text: The defect edges were debeveled with a #15 scalpel blade.  Given the location of the defect and the proximity to free margins a chondrocutaneous helical advancement flap was deemed most appropriate.  Using a sterile surgical marker, the appropriate advancement flap was drawn incorporating the defect and placing the expected incisions within the relaxed skin tension lines where possible.    The area thus outlined was incised deep to adipose tissue with a #15 scalpel blade.  The skin margins were undermined to an appropriate distance in all directions utilizing iris scissors.
Bcc Pigmented Histology Text: Basaloid atypia with pigmentation noted and marked accordingly on the Mohs map.
Double O-Z Plasty Text: The defect edges were debeveled with a #15 scalpel blade.  Given the location of the defect, shape of the defect and the proximity to free margins a Double O-Z plasty (double transposition flap) was deemed most appropriate.  Using a sterile surgical marker, the appropriate transposition flaps were drawn incorporating the defect and placing the expected incisions within the relaxed skin tension lines where possible. The area thus outlined was incised deep to adipose tissue with a #15 scalpel blade.  The skin margins were undermined to an appropriate distance in all directions utilizing iris scissors.  Hemostasis was achieved with electrocautery.  The flaps were then transposed into place, one clockwise and the other counterclockwise, and anchored with interrupted buried subcutaneous sutures.
Bi-Rhombic Flap Text: The defect edges were debeveled with a #15 scalpel blade.  Given the location of the defect and the proximity to free margins a bi-rhombic flap was deemed most appropriate.  Using a sterile surgical marker, an appropriate rhombic flap was drawn incorporating the defect. The area thus outlined was incised deep to adipose tissue with a #15 scalpel blade.  The skin margins were undermined to an appropriate distance in all directions utilizing iris scissors.
Epidermal Autograft Text: The defect edges were debeveled with a #15 scalpel blade.  Given the location of the defect, shape of the defect and the proximity to free margins an epidermal autograft was deemed most appropriate.  Using a sterile surgical marker, the primary defect shape was transferred to the donor site. The epidermal graft was then harvested.  The skin graft was then placed in the primary defect and oriented appropriately.
W Plasty Text: The lesion was extirpated to the level of the fat with a #15 scalpel blade.  Given the location of the defect, shape of the defect and the proximity to free margins a W-plasty was deemed most appropriate for repair.  Using a sterile surgical marker, the appropriate transposition arms of the W-plasty were drawn incorporating the defect and placing the expected incisions within the relaxed skin tension lines where possible.    The area thus outlined was incised deep to adipose tissue with a #15 scalpel blade.  The skin margins were undermined to an appropriate distance in all directions utilizing iris scissors.  The opposing transposition arms were then transposed into place in opposite direction and anchored with interrupted buried subcutaneous sutures.
Information: Selecting Yes will display possible errors in your note based on the variables you have selected. This validation is only offered as a suggestion for you. PLEASE NOTE THAT THE VALIDATION TEXT WILL BE REMOVED WHEN YOU FINALIZE YOUR NOTE. IF YOU WANT TO FAX A PRELIMINARY NOTE YOU WILL NEED TO TOGGLE THIS TO 'NO' IF YOU DO NOT WANT IT IN YOUR FAXED NOTE.
Consent (Spinal Accessory)/Introductory Paragraph: The rationale for Mohs was explained to the patient and consent was obtained. The risks, benefits and alternatives to therapy were discussed in detail. Specifically, the risks of damage to the spinal accessory nerve, infection, scarring, bleeding, prolonged wound healing, incomplete removal, allergy to anesthesia, and recurrence were addressed. Prior to the procedure, the treatment site was clearly identified and confirmed by the patient.
O-L Flap Text: The defect edges were debeveled with a #15 scalpel blade.  Given the location of the defect, shape of the defect and the proximity to free margins an O-L flap was deemed most appropriate.  Using a sterile surgical marker, an appropriate advancement flap was drawn incorporating the defect and placing the expected incisions within the relaxed skin tension lines where possible.    The area thus outlined was incised deep to adipose tissue with a #15 scalpel blade.  The skin margins were undermined to an appropriate distance in all directions utilizing iris scissors.
Mixed Nodular And Infiltrative Bcc Histology Text: Basaloid atypia in an infiltrative and nodular pattern noted and marked accordingly on the Mohs map.
Non-Graft Cartilage Fenestration Text: The cartilage was fenestrated with a 2mm punch biopsy to help facilitate healing.
Banner Transposition Flap Text: The defect edges were debeveled with a #15 scalpel blade.  Given the location of the defect and the proximity to free margins a Banner transposition flap was deemed most appropriate.  Using a sterile surgical marker, an appropriate flap drawn around the defect. The area thus outlined was incised deep to adipose tissue with a #15 scalpel blade.  The skin margins were undermined to an appropriate distance in all directions utilizing iris scissors.
Xenograft Text: The defect edges were debeveled with a #15 scalpel blade.  Given the location of the defect, shape of the defect and the proximity to free margins a xenograft was deemed most appropriate.  The graft was then trimmed to fit the size of the defect.  The graft was then placed in the primary defect and oriented appropriately.
Mauc Instructions: By selecting yes to the question below the MAUC number will be added into the note.  This will be calculated automatically based on the diagnosis chosen, the size entered, the body zone selected (H,M,L) and the specific indications you chose. You will also have the option to override the Mohs AUC if you disagree with the automatically calculated number and this option is found in the Case Summary tab.
Advancement-Rotation Flap Text: The defect edges were debeveled with a #15 scalpel blade.  Given the location of the defect, shape of the defect and the proximity to free margins an advancement-rotation flap was deemed most appropriate.  Using a sterile surgical marker, an appropriate flap was drawn incorporating the defect and placing the expected incisions within the relaxed skin tension lines where possible. The area thus outlined was incised deep to adipose tissue with a #15 scalpel blade.  The skin margins were undermined to an appropriate distance in all directions utilizing iris scissors.
Interpolation Flap Text: A decision was made to reconstruct the defect utilizing an interpolation axial flap and a staged reconstruction.  A telfa template was made of the defect.  This telfa template was then used to outline the interpolation flap.  The donor area for the pedicle flap was then injected with anesthesia.  The flap was excised through the skin and subcutaneous tissue down to the layer of the underlying musculature.  The interpolation flap was carefully excised within this deep plane to maintain its blood supply.  The edges of the donor site were undermined.   The donor site was closed in a primary fashion.  The pedicle was then rotated into position and sutured.  Once the tube was sutured into place, adequate blood supply was confirmed with blanching and refill.  The pedicle was then wrapped with xeroform gauze and dressed appropriately with a telfa and gauze bandage to ensure continued blood supply and protect the attached pedicle.
Area M Indication Text: Tumors in this location are included in Area M (cheek, forehead, scalp, neck, jawline and pretibial skin).  Mohs surgery is indicated for tumors in these anatomic locations.
Consent (Lip)/Introductory Paragraph: The rationale for Mohs was explained to the patient and consent was obtained. The risks, benefits and alternatives to therapy were discussed in detail. Specifically, the risks of lip deformity, changes in the oral aperture, infection, scarring, bleeding, prolonged wound healing, incomplete removal, allergy to anesthesia, nerve injury and recurrence were addressed. Prior to the procedure, the treatment site was clearly identified and confirmed by the patient.
Surgeon/Pathologist Verbiage (Will Incorporate Name Of Surgeon From Intro If Not Blank): operated in two distinct and integrated capacities as the surgeon and pathologist.
Scc Histology Text: Squamous atypia noted and marked accordingly on the Mohs map.
Localized Dermabrasion With Wire Brush Text: The patient was draped in routine manner.  Localized dermabrasion using 3 x 17 mm wire brush was performed in routine manner to papillary dermis. This spot dermabrasion is being performed to complete skin cancer reconstruction. It also will eliminate the other sun damaged precancerous cells that are known to be part of the regional effect of a lifetime's worth of sun exposure. This localized dermabrasion is therapeutic and should not be considered cosmetic in any regard.
Island Pedicle Flap Text: The defect edges were debeveled with a #15 scalpel blade.  Given the location of the defect, shape of the defect and the proximity to free margins an island pedicle advancement flap was deemed most appropriate.  Using a sterile surgical marker, an appropriate advancement flap was drawn incorporating the defect, outlining the appropriate donor tissue and placing the expected incisions within the relaxed skin tension lines where possible.    The area thus outlined was incised deep to adipose tissue with a #15 scalpel blade.  The skin margins were undermined to an appropriate distance in all directions around the primary defect and laterally outward around the island pedicle utilizing iris scissors.  There was minimal undermining beneath the pedicle flap.
Subsequent Stages Histo Method Verbiage: Using a similar technique to that described above, a thin layer of tissue was removed from all areas where tumor was visible on the previous stage.  The tissue was again oriented, mapped, dyed, and processed as above.
Rotation Flap Text: The defect edges were debeveled with a #15 scalpel blade.  Given the location of the defect, shape of the defect and the proximity to free margins a rotation flap was deemed most appropriate.  Using a sterile surgical marker, an appropriate rotation flap was drawn incorporating the defect and placing the expected incisions within the relaxed skin tension lines where possible.    The area thus outlined was incised deep to adipose tissue with a #15 scalpel blade.  The skin margins were undermined to an appropriate distance in all directions utilizing iris scissors.
Bcc Histology Text: There were numerous aggregates of basaloid cells.
Cheiloplasty (Less Than 50%) Text: A decision was made to reconstruct the defect with a  cheiloplasty.  The defect was undermined extensively.  Additional obicularis oris muscle was excised with a 15 blade scalpel.  The defect was converted into a full thickness wedge, of less than 50% of the vertical height of the lip, to facilite a better cosmetic result.  Small vessels were then tied off with 5-0 monocyrl. The obicularis oris, superficial fascia, adipose and dermis were then reapproximated.  After the deeper layers were approximated the epidermis was reapproximated with particular care given to realign the vermillion border.
Estimated Blood Loss (Cc): minimal
Date Of Previous Biopsy (Optional): 6/21/19
Wound Care: Vaseline
Island Pedicle Flap-Requiring Vessel Identification Text: The defect edges were debeveled with a #15 scalpel blade.  Given the location of the defect, shape of the defect and the proximity to free margins an island pedicle advancement flap was deemed most appropriate.  Using a sterile surgical marker, an appropriate advancement flap was drawn, based on the axial vessel mentioned above, incorporating the defect, outlining the appropriate donor tissue and placing the expected incisions within the relaxed skin tension lines where possible.    The area thus outlined was incised deep to adipose tissue with a #15 scalpel blade.  The skin margins were undermined to an appropriate distance in all directions around the primary defect and laterally outward around the island pedicle utilizing iris scissors.  There was minimal undermining beneath the pedicle flap.
Muscle Hinge Flap Text: The defect edges were debeveled with a #15 scalpel blade.  Given the size, depth and location of the defect and the proximity to free margins a muscle hinge flap was deemed most appropriate.  Using a sterile surgical marker, an appropriate hinge flap was drawn incorporating the defect. The area thus outlined was incised with a #15 scalpel blade.  The skin margins were undermined to an appropriate distance in all directions utilizing iris scissors.
Ftsg Text: The defect edges were debeveled with a #15 scalpel blade.  Given the location of the defect, shape of the defect and the proximity to free margins a full thickness skin graft was deemed most appropriate.  Using a sterile surgical marker, the primary defect shape was transferred to the donor site. The area thus outlined was incised deep to adipose tissue with a #15 scalpel blade.  The harvested graft was then trimmed of adipose tissue until only dermis and epidermis was left.  The skin margins of the secondary defect were undermined to an appropriate distance in all directions utilizing iris scissors.  The secondary defect was closed with interrupted buried subcutaneous sutures.  The skin edges were then re-apposed with running  sutures.  The skin graft was then placed in the primary defect and oriented appropriately.
Postop Diagnosis: same
Post-Care Instructions: I reviewed with the patient in detail post-care instructions. Patient is not to engage in any heavy lifting, exercise, or swimming for the next 14 days. Should the patient develop any fevers, chills, bleeding, severe pain patient will contact the office immediately.
Referring Physician (Optional): ana
Consent 2/Introductory Paragraph: Mohs surgery was explained to the patient and consent was obtained. The risks, benefits and alternatives to therapy were discussed in detail. Specifically, the risks of infection, scarring, bleeding, prolonged wound healing, incomplete removal, allergy to anesthesia, nerve injury and recurrence were addressed. Prior to the procedure, the treatment site was clearly identified and confirmed by the patient.

## 2019-07-26 ENCOUNTER — APPOINTMENT (RX ONLY)
Dept: URBAN - METROPOLITAN AREA CLINIC 5 | Facility: CLINIC | Age: 64
Setting detail: DERMATOLOGY
End: 2019-07-26

## 2019-07-26 DIAGNOSIS — Z48.02 ENCOUNTER FOR REMOVAL OF SUTURES: ICD-10-CM

## 2019-07-26 PROCEDURE — 99024 POSTOP FOLLOW-UP VISIT: CPT

## 2019-07-26 PROCEDURE — ? SUTURE REMOVAL (GLOBAL PERIOD)

## 2019-07-26 ASSESSMENT — LOCATION DETAILED DESCRIPTION DERM: LOCATION DETAILED: LEFT NASAL ALA

## 2019-07-26 ASSESSMENT — LOCATION ZONE DERM: LOCATION ZONE: NOSE

## 2019-07-26 ASSESSMENT — LOCATION SIMPLE DESCRIPTION DERM: LOCATION SIMPLE: LEFT NOSE

## 2019-07-26 NOTE — PROCEDURE: SUTURE REMOVAL (GLOBAL PERIOD)
Detail Level: Detailed
Add 93972 Cpt? (Important Note: In 2017 The Use Of 98294 Is Being Tracked By Cms To Determine Future Global Period Reimbursement For Global Periods): yes

## 2020-02-19 ENCOUNTER — APPOINTMENT (RX ONLY)
Dept: URBAN - METROPOLITAN AREA CLINIC 24 | Facility: CLINIC | Age: 65
Setting detail: DERMATOLOGY
End: 2020-02-19

## 2020-02-19 DIAGNOSIS — L73.8 OTHER SPECIFIED FOLLICULAR DISORDERS: ICD-10-CM

## 2020-02-19 DIAGNOSIS — D18.0 HEMANGIOMA: ICD-10-CM

## 2020-02-19 DIAGNOSIS — D22 MELANOCYTIC NEVI: ICD-10-CM

## 2020-02-19 DIAGNOSIS — L57.0 ACTINIC KERATOSIS: ICD-10-CM

## 2020-02-19 DIAGNOSIS — L81.4 OTHER MELANIN HYPERPIGMENTATION: ICD-10-CM

## 2020-02-19 DIAGNOSIS — B07.8 OTHER VIRAL WARTS: ICD-10-CM

## 2020-02-19 DIAGNOSIS — L82.1 OTHER SEBORRHEIC KERATOSIS: ICD-10-CM

## 2020-02-19 DIAGNOSIS — Z85.828 PERSONAL HISTORY OF OTHER MALIGNANT NEOPLASM OF SKIN: ICD-10-CM

## 2020-02-19 PROBLEM — D22.5 MELANOCYTIC NEVI OF TRUNK: Status: ACTIVE | Noted: 2020-02-19

## 2020-02-19 PROBLEM — D48.5 NEOPLASM OF UNCERTAIN BEHAVIOR OF SKIN: Status: ACTIVE | Noted: 2020-02-19

## 2020-02-19 PROBLEM — D22.39 MELANOCYTIC NEVI OF OTHER PARTS OF FACE: Status: ACTIVE | Noted: 2020-02-19

## 2020-02-19 PROBLEM — D22.61 MELANOCYTIC NEVI OF RIGHT UPPER LIMB, INCLUDING SHOULDER: Status: ACTIVE | Noted: 2020-02-19

## 2020-02-19 PROBLEM — D18.01 HEMANGIOMA OF SKIN AND SUBCUTANEOUS TISSUE: Status: ACTIVE | Noted: 2020-02-19

## 2020-02-19 PROCEDURE — ? LIQUID NITROGEN

## 2020-02-19 PROCEDURE — ? OBSERVATION

## 2020-02-19 PROCEDURE — ? BIOPSY BY SHAVE METHOD

## 2020-02-19 PROCEDURE — 11103 TANGNTL BX SKIN EA SEP/ADDL: CPT

## 2020-02-19 PROCEDURE — 17000 DESTRUCT PREMALG LESION: CPT | Mod: 59

## 2020-02-19 PROCEDURE — 11102 TANGNTL BX SKIN SINGLE LES: CPT | Mod: 59

## 2020-02-19 PROCEDURE — 99214 OFFICE O/P EST MOD 30 MIN: CPT | Mod: 25

## 2020-02-19 PROCEDURE — ? COUNSELING

## 2020-02-19 PROCEDURE — 17003 DESTRUCT PREMALG LES 2-14: CPT | Mod: 59

## 2020-02-19 PROCEDURE — 17110 DESTRUCTION B9 LES UP TO 14: CPT

## 2020-02-19 ASSESSMENT — LOCATION DETAILED DESCRIPTION DERM
LOCATION DETAILED: EPIGASTRIC SKIN
LOCATION DETAILED: LEFT LATERAL KNEE
LOCATION DETAILED: RIGHT ANTECUBITAL SKIN
LOCATION DETAILED: LEFT INFERIOR FOREHEAD
LOCATION DETAILED: RIGHT MEDIAL BREAST 12-1:00 REGION
LOCATION DETAILED: RIGHT SUPERIOR UPPER BACK
LOCATION DETAILED: LEFT INFERIOR LATERAL FOREHEAD
LOCATION DETAILED: LEFT INFERIOR MEDIAL FOREHEAD
LOCATION DETAILED: SUPERIOR THORACIC SPINE
LOCATION DETAILED: LEFT INFRAMAMMARY CREASE (INNER QUADRANT)
LOCATION DETAILED: LEFT MEDIAL FOREHEAD
LOCATION DETAILED: RIGHT ANTERIOR SHOULDER
LOCATION DETAILED: LEFT FOREHEAD
LOCATION DETAILED: RIGHT FOREHEAD
LOCATION DETAILED: SUBXIPHOID
LOCATION DETAILED: RIGHT NASAL SIDEWALL
LOCATION DETAILED: LEFT SUPERIOR UPPER BACK
LOCATION DETAILED: RIGHT INFERIOR UPPER BACK
LOCATION DETAILED: RIGHT MID-UPPER BACK
LOCATION DETAILED: RIGHT SUBMANDIBULAR AREA
LOCATION DETAILED: RIGHT SUPERIOR MEDIAL FOREHEAD
LOCATION DETAILED: LEFT POSTERIOR SHOULDER
LOCATION DETAILED: LEFT SUPERIOR LATERAL UPPER BACK
LOCATION DETAILED: LOWER STERNUM
LOCATION DETAILED: LEFT LATERAL FOREHEAD
LOCATION DETAILED: RIGHT RIB CAGE
LOCATION DETAILED: SUPERIOR MID FOREHEAD
LOCATION DETAILED: RIGHT SUPERIOR LATERAL UPPER BACK
LOCATION DETAILED: LEFT MID-UPPER BACK
LOCATION DETAILED: RIGHT ANTERIOR PROXIMAL THIGH

## 2020-02-19 ASSESSMENT — LOCATION SIMPLE DESCRIPTION DERM
LOCATION SIMPLE: LEFT FOREHEAD
LOCATION SIMPLE: LEFT KNEE
LOCATION SIMPLE: RIGHT UPPER BACK
LOCATION SIMPLE: ABDOMEN
LOCATION SIMPLE: SUPERIOR FOREHEAD
LOCATION SIMPLE: RIGHT UPPER ARM
LOCATION SIMPLE: RIGHT BREAST
LOCATION SIMPLE: RIGHT BACK
LOCATION SIMPLE: RIGHT SUBMANDIBULAR AREA
LOCATION SIMPLE: RIGHT THIGH
LOCATION SIMPLE: RIGHT NOSE
LOCATION SIMPLE: LEFT SHOULDER
LOCATION SIMPLE: RIGHT SHOULDER
LOCATION SIMPLE: LEFT UPPER BACK
LOCATION SIMPLE: UPPER BACK
LOCATION SIMPLE: LEFT BREAST
LOCATION SIMPLE: RIGHT FOREHEAD
LOCATION SIMPLE: CHEST

## 2020-02-19 ASSESSMENT — LOCATION ZONE DERM
LOCATION ZONE: LEG
LOCATION ZONE: NOSE
LOCATION ZONE: FACE
LOCATION ZONE: TRUNK
LOCATION ZONE: ARM

## 2020-02-19 NOTE — PROCEDURE: LIQUID NITROGEN
Post-Care Instructions: I reviewed with the patient in detail post-care instructions. Patient is to wear sunprotection, and avoid picking at any of the treated lesions. Pt may apply Vaseline to crusted or scabbing areas.
Render Post-Care Instructions In Note?: no
Consent: The patient's consent was obtained including but not limited to risks of crusting, scabbing, blistering, scarring, darker or lighter pigmentary change, recurrence, incomplete removal and infection.
Detail Level: Detailed
Medical Necessity Clause: This procedure was medically necessary because the lesions that were treated were:
Medical Necessity Information: It is in your best interest to select a reason for this procedure from the list below. All of these items fulfill various CMS LCD requirements except the new and changing color options.
Duration Of Freeze Thaw-Cycle (Seconds): 0

## 2020-02-19 NOTE — PROCEDURE: BIOPSY BY SHAVE METHOD
Body Location Override (Optional - Billing Will Still Be Based On Selected Body Map Location If Applicable): left mid clavicle
Detail Level: Detailed
Depth Of Biopsy: dermis
Was A Bandage Applied: Yes
Size Of Lesion In Cm: 0
Biopsy Type: H and E
Biopsy Method: Dermablade
Anesthesia Type: 1% lidocaine with epinephrine
Anesthesia Volume In Cc: 0.5
Hemostasis: Drysol
Wound Care: Petrolatum
Dressing: bandage
Destruction After The Procedure: No
Type Of Destruction Used: Curettage
Curettage Text: The wound bed was treated with curettage after the biopsy was performed.
Cryotherapy Text: The wound bed was treated with cryotherapy after the biopsy was performed.
Electrodesiccation Text: The wound bed was treated with electrodesiccation after the biopsy was performed.
Electrodesiccation And Curettage Text: The wound bed was treated with electrodesiccation and curettage after the biopsy was performed.
Silver Nitrate Text: The wound bed was treated with silver nitrate after the biopsy was performed.
Lab: [id number]
Lab Facility: [id number]
Consent: Written consent was obtained and risks were reviewed including but not limited to scarring, infection, bleeding, scabbing, incomplete removal, nerve damage and allergy to anesthesia.
Post-Care Instructions: I reviewed with the patient in detail post-care instructions. Patient is to keep the biopsy site dry overnight, and then apply bacitracin twice daily until healed. Patient may apply hydrogen peroxide soaks to remove any crusting.
Notification Instructions: Patient will be notified of biopsy results. However, patient instructed to call the office if not contacted within 2 weeks.
Billing Type: Third-Party Bill
Body Location Override (Optional - Billing Will Still Be Based On Selected Body Map Location If Applicable): left lateral clavicle

## 2020-03-04 ENCOUNTER — APPOINTMENT (RX ONLY)
Dept: URBAN - METROPOLITAN AREA CLINIC 24 | Facility: CLINIC | Age: 65
Setting detail: DERMATOLOGY
End: 2020-03-04

## 2020-03-04 DIAGNOSIS — Z71.89 OTHER SPECIFIED COUNSELING: ICD-10-CM

## 2020-03-04 PROBLEM — C44.619 BASAL CELL CARCINOMA OF SKIN OF LEFT UPPER LIMB, INCLUDING SHOULDER: Status: ACTIVE | Noted: 2020-03-04

## 2020-03-04 PROCEDURE — 17262 DSTRJ MAL LES T/A/L 1.1-2.0: CPT

## 2020-03-04 PROCEDURE — ? COUNSELING

## 2020-03-04 PROCEDURE — ? SUNSCREEN RECOMMENDATIONS

## 2020-03-04 PROCEDURE — ? CURETTAGE AND DESTRUCTION

## 2020-03-04 NOTE — PROCEDURE: MIPS QUALITY
Quality 402: Tobacco Use And Help With Quitting Among Adolescents: Patient screened for tobacco and is an ex-smoker
Quality 431: Preventive Care And Screening: Unhealthy Alcohol Use - Screening: Patient screened for unhealthy alcohol use using a single question and scores less than 2 times per year
Quality 110: Preventive Care And Screening: Influenza Immunization: Influenza Immunization previously received during influenza season
Detail Level: Detailed
Quality 111:Pneumonia Vaccination Status For Older Adults: Pneumococcal Vaccination not Administered or Previously Received, Reason not Otherwise Specified
Quality 226: Preventive Care And Screening: Tobacco Use: Screening And Cessation Intervention: Patient screened for tobacco use and is an ex/non-smoker

## 2020-03-04 NOTE — PROCEDURE: CURETTAGE AND DESTRUCTION
Body Location Override (Optional - Billing Will Still Be Based On Selected Body Map Location If Applicable): left lateral clavicle
Detail Level: Detailed
Biopsy Photograph Reviewed: Yes
Size Of Lesion In Cm: 0.9
Size Of Lesion After Curettage: 1.2
Add Intralesional Injection: No
Total Volume (Ccs): 1
Anesthesia Type: 1% lidocaine with epinephrine
Anesthesia Volume In Cc: 2
Cautery Type: electrodesiccation
Number Of Curettages: 3
What Was Performed First?: Curettage
Additional Information: (Optional): The wound was cleaned, and a pressure dressing was applied.  The patient received detailed post-op instructions.
Consent was obtained from the patient. The risks, benefits and alternatives to therapy were discussed in detail. Specifically, the risks of infection, scarring, bleeding, prolonged wound healing, nerve injury, incomplete removal, allergy to anesthesia and recurrence were addressed. Alternatives to ED&C, such as: surgical removal and XRT were also discussed.  Prior to the procedure, the treatment site was clearly identified and confirmed by the patient. All components of Universal Protocol/PAUSE Rule completed.
Post-Care Instructions: I reviewed with the patient in detail post-care instructions. Patient is to keep the area dry for 48 hours, and not to engage in any swimming until the area is healed. Should the patient develop any fevers, chills, bleeding, severe pain patient will contact the office immediately.\\n\\n- bx proven BCC NOD left lateral clavicle \\n- accession #[id number]
Bill As A Line Item Or As Units: Line Item

## 2020-08-19 ENCOUNTER — APPOINTMENT (RX ONLY)
Dept: URBAN - METROPOLITAN AREA CLINIC 24 | Facility: CLINIC | Age: 65
Setting detail: DERMATOLOGY
End: 2020-08-19

## 2020-08-19 DIAGNOSIS — D22 MELANOCYTIC NEVI: ICD-10-CM

## 2020-08-19 DIAGNOSIS — D17 BENIGN LIPOMATOUS NEOPLASM: ICD-10-CM

## 2020-08-19 DIAGNOSIS — L82.0 INFLAMED SEBORRHEIC KERATOSIS: ICD-10-CM

## 2020-08-19 DIAGNOSIS — L57.0 ACTINIC KERATOSIS: ICD-10-CM

## 2020-08-19 DIAGNOSIS — L82.1 OTHER SEBORRHEIC KERATOSIS: ICD-10-CM

## 2020-08-19 DIAGNOSIS — D18.0 HEMANGIOMA: ICD-10-CM

## 2020-08-19 DIAGNOSIS — Z85.828 PERSONAL HISTORY OF OTHER MALIGNANT NEOPLASM OF SKIN: ICD-10-CM

## 2020-08-19 PROBLEM — D22.0 MELANOCYTIC NEVI OF LIP: Status: ACTIVE | Noted: 2020-08-19

## 2020-08-19 PROBLEM — D22.39 MELANOCYTIC NEVI OF OTHER PARTS OF FACE: Status: ACTIVE | Noted: 2020-08-19

## 2020-08-19 PROBLEM — D48.5 NEOPLASM OF UNCERTAIN BEHAVIOR OF SKIN: Status: ACTIVE | Noted: 2020-08-19

## 2020-08-19 PROBLEM — D22.5 MELANOCYTIC NEVI OF TRUNK: Status: ACTIVE | Noted: 2020-08-19

## 2020-08-19 PROBLEM — D17.1 BENIGN LIPOMATOUS NEOPLASM OF SKIN AND SUBCUTANEOUS TISSUE OF TRUNK: Status: ACTIVE | Noted: 2020-08-19

## 2020-08-19 PROBLEM — D18.01 HEMANGIOMA OF SKIN AND SUBCUTANEOUS TISSUE: Status: ACTIVE | Noted: 2020-08-19

## 2020-08-19 PROCEDURE — 99213 OFFICE O/P EST LOW 20 MIN: CPT | Mod: 25

## 2020-08-19 PROCEDURE — 17000 DESTRUCT PREMALG LESION: CPT | Mod: 59

## 2020-08-19 PROCEDURE — ? OBSERVATION

## 2020-08-19 PROCEDURE — ? BIOPSY BY SHAVE METHOD

## 2020-08-19 PROCEDURE — 17110 DESTRUCTION B9 LES UP TO 14: CPT

## 2020-08-19 PROCEDURE — ? LIQUID NITROGEN

## 2020-08-19 PROCEDURE — ? COUNSELING

## 2020-08-19 PROCEDURE — 11102 TANGNTL BX SKIN SINGLE LES: CPT | Mod: 59

## 2020-08-19 PROCEDURE — ? TREATMENT REGIMEN

## 2020-08-19 ASSESSMENT — LOCATION SIMPLE DESCRIPTION DERM
LOCATION SIMPLE: CHEST
LOCATION SIMPLE: LEFT THIGH
LOCATION SIMPLE: ABDOMEN
LOCATION SIMPLE: LOWER BACK
LOCATION SIMPLE: LEFT UPPER BACK
LOCATION SIMPLE: RIGHT ELBOW
LOCATION SIMPLE: RIGHT CHEEK
LOCATION SIMPLE: RIGHT HAND
LOCATION SIMPLE: LEFT CALF
LOCATION SIMPLE: RIGHT FOREARM
LOCATION SIMPLE: LEFT PRETIBIAL REGION
LOCATION SIMPLE: LEFT POSTERIOR THIGH
LOCATION SIMPLE: LEFT NOSE
LOCATION SIMPLE: RIGHT SUBMANDIBULAR AREA
LOCATION SIMPLE: NOSE
LOCATION SIMPLE: RIGHT PRETIBIAL REGION
LOCATION SIMPLE: RIGHT ANTERIOR NECK
LOCATION SIMPLE: LEFT CLAVICULAR SKIN
LOCATION SIMPLE: RIGHT THIGH
LOCATION SIMPLE: RIGHT LIP
LOCATION SIMPLE: RIGHT LOWER BACK
LOCATION SIMPLE: RIGHT CALF
LOCATION SIMPLE: RIGHT SHOULDER
LOCATION SIMPLE: RIGHT POSTERIOR THIGH
LOCATION SIMPLE: RIGHT POSTERIOR UPPER ARM
LOCATION SIMPLE: RIGHT UPPER BACK
LOCATION SIMPLE: RIGHT UPPER ARM

## 2020-08-19 ASSESSMENT — LOCATION ZONE DERM
LOCATION ZONE: TRUNK
LOCATION ZONE: NOSE
LOCATION ZONE: HAND
LOCATION ZONE: LEG
LOCATION ZONE: LIP
LOCATION ZONE: ARM
LOCATION ZONE: NECK
LOCATION ZONE: FACE

## 2020-08-19 ASSESSMENT — LOCATION DETAILED DESCRIPTION DERM
LOCATION DETAILED: LEFT PROXIMAL PRETIBIAL REGION
LOCATION DETAILED: RIGHT PROXIMAL PRETIBIAL REGION
LOCATION DETAILED: RIGHT PROXIMAL POSTERIOR UPPER ARM
LOCATION DETAILED: RIGHT ANTERIOR DISTAL THIGH
LOCATION DETAILED: LEFT NASAL ALA
LOCATION DETAILED: LEFT CLAVICULAR SKIN
LOCATION DETAILED: RIGHT INFERIOR LATERAL MIDBACK
LOCATION DETAILED: RIGHT LATERAL SUPERIOR CHEST
LOCATION DETAILED: RIGHT LOWER CUTANEOUS LIP
LOCATION DETAILED: RIGHT VENTRAL MEDIAL PROXIMAL FOREARM
LOCATION DETAILED: RIGHT POSTERIOR SHOULDER
LOCATION DETAILED: LEFT ANTERIOR DISTAL THIGH
LOCATION DETAILED: LEFT DISTAL POSTERIOR THIGH
LOCATION DETAILED: LEFT PROXIMAL CALF
LOCATION DETAILED: RIGHT SUBMANDIBULAR AREA
LOCATION DETAILED: LEFT PROXIMAL LATERAL CALF
LOCATION DETAILED: RIGHT DISTAL POSTERIOR THIGH
LOCATION DETAILED: LEFT INFERIOR MEDIAL UPPER BACK
LOCATION DETAILED: NASAL DORSUM
LOCATION DETAILED: RIGHT PROXIMAL CALF
LOCATION DETAILED: SUPERIOR LUMBAR SPINE
LOCATION DETAILED: RIGHT INFERIOR UPPER BACK
LOCATION DETAILED: LEFT MID-UPPER BACK
LOCATION DETAILED: EPIGASTRIC SKIN
LOCATION DETAILED: LEFT SUPERIOR UPPER BACK
LOCATION DETAILED: RIGHT ANTERIOR MEDIAL PROXIMAL UPPER ARM
LOCATION DETAILED: RIGHT INFERIOR ANTERIOR NECK
LOCATION DETAILED: RIGHT RADIAL DORSAL HAND
LOCATION DETAILED: RIGHT CENTRAL MALAR CHEEK
LOCATION DETAILED: LEFT RIB CAGE
LOCATION DETAILED: RIGHT ELBOW

## 2020-08-19 NOTE — HPI: SKIN LESION
What Type Of Note Output Would You Prefer (Optional)?: Bullet Format
How Severe Is Your Skin Lesion?: moderate
Has Your Skin Lesion Been Treated?: been treated
Is This A New Presentation, Or A Follow-Up?: Follow Up Skin Lesion
Has Your Skin Lesion Been Treated?: not been treated
Is This A New Presentation, Or A Follow-Up?: Skin Lesion

## 2020-08-19 NOTE — PROCEDURE: LIQUID NITROGEN
Consent: The patient's consent was obtained including but not limited to risks of crusting, scabbing, blistering, scarring, darker or lighter pigmentary change, recurrence, incomplete removal and infection.
Duration Of Freeze Thaw-Cycle (Seconds): 5-15
Include Z78.9 (Other Specified Conditions Influencing Health Status) As An Associated Diagnosis?: No
Render Post-Care Instructions In Note?: yes
Number Of Freeze-Thaw Cycles: 1 freeze-thaw cycle
Post-Care Instructions: I reviewed with the patient in detail post-care instructions. Patient is to wear sunprotection, and avoid picking at any of the treated lesions. Pt may apply Vaseline to crusted or scabbing areas.
Medical Necessity Clause: This procedure was medically necessary because the lesions that were treated were:
Medical Necessity Information: It is in your best interest to select a reason for this procedure from the list below. All of these items fulfill various CMS LCD requirements except the new and changing color options.
Detail Level: Detailed
Duration Of Freeze Thaw-Cycle (Seconds): 10

## 2020-08-19 NOTE — PROCEDURE: BIOPSY BY SHAVE METHOD
Detail Level: Detailed
Depth Of Biopsy: dermis
Was A Bandage Applied: Yes
Size Of Lesion In Cm: 0
Anticipated Plan (Based On Presumed Biopsy Results): If malignant will schedule mohs surgery
Biopsy Type: H and E
Biopsy Method: Dermablade
Anesthesia Type: 1% lidocaine with epinephrine
Anesthesia Volume In Cc: 0.5
Hemostasis: Aluminum Chloride
Wound Care: Vaseline
Dressing: bandage
Destruction After The Procedure: No
Type Of Destruction Used: Curettage
Curettage Text: The wound bed was treated with curettage after the biopsy was performed.
Cryotherapy Text: The wound bed was treated with cryotherapy after the biopsy was performed.
Electrodesiccation Text: The wound bed was treated with electrodesiccation after the biopsy was performed.
Electrodesiccation And Curettage Text: The wound bed was treated with electrodesiccation and curettage after the biopsy was performed.
Silver Nitrate Text: The wound bed was treated with silver nitrate after the biopsy was performed.
Lab: [id number]
Lab Facility: [id number]
Consent: Written consent was obtained and risks were reviewed including but not limited to scarring, infection, bleeding, scabbing, incomplete removal, nerve damage and allergy to anesthesia.
Post-Care Instructions: I reviewed with the patient in detail post-care instructions. Patient is to keep the biopsy site dry overnight, and then apply bacitracin twice daily until healed. Patient may apply hydrogen peroxide soaks to remove any crusting.
Notification Instructions: Patient will be notified of biopsy results. However, patient instructed to call the office if not contacted within 2 weeks.
Billing Type: Third-Party Bill
Information: Selecting Yes will display possible errors in your note based on the variables you have selected. This validation is only offered as a suggestion for you. PLEASE NOTE THAT THE VALIDATION TEXT WILL BE REMOVED WHEN YOU FINALIZE YOUR NOTE. IF YOU WANT TO FAX A PRELIMINARY NOTE YOU WILL NEED TO TOGGLE THIS TO 'NO' IF YOU DO NOT WANT IT IN YOUR FAXED NOTE.

## 2020-08-19 NOTE — PROCEDURE: OBSERVATION
Body Location Override (Optional - Billing Will Still Be Based On Selected Body Map Location If Applicable): right lateral chest
Detail Level: Simple
Size Of Lesion In Cm (Optional): 0
Body Location Override (Optional - Billing Will Still Be Based On Selected Body Map Location If Applicable): left lateral clavicle
Detail Level: Detailed

## 2020-10-05 ENCOUNTER — APPOINTMENT (RX ONLY)
Dept: URBAN - METROPOLITAN AREA CLINIC 5 | Facility: CLINIC | Age: 65
Setting detail: DERMATOLOGY
End: 2020-10-05

## 2020-10-05 PROBLEM — C44.41 BASAL CELL CARCINOMA OF SKIN OF SCALP AND NECK: Status: ACTIVE | Noted: 2020-10-05

## 2020-10-05 PROCEDURE — 13132 CMPLX RPR F/C/C/M/N/AX/G/H/F: CPT

## 2020-10-05 PROCEDURE — ? COUNSELING

## 2020-10-05 PROCEDURE — ? MOHS SURGERY

## 2020-10-05 PROCEDURE — 17311 MOHS 1 STAGE H/N/HF/G: CPT

## 2020-10-05 NOTE — PROCEDURE: MIPS QUALITY
Quality 226: Preventive Care And Screening: Tobacco Use: Screening And Cessation Intervention: Patient screened for tobacco use and is an ex/non-smoker
Quality 130: Documentation Of Current Medications In The Medical Record: Current Medications Documented
Quality 111:Pneumonia Vaccination Status For Older Adults: Pneumococcal Vaccination Previously Received
Detail Level: Detailed
Quality 110: Preventive Care And Screening: Influenza Immunization: Influenza Immunization previously received during influenza season

## 2020-10-05 NOTE — PROCEDURE: MOHS SURGERY
Patient Name (Optional- Will Render 'the Patient' If Blank): Lizzie Mendoza
Mohs Case Number: [id number]
Date Of Previous Biopsy (Optional): 08/19
Previous Accession (Optional): [id number]
Biopsy Photograph Reviewed: Yes
Referring Physician (Optional): Jhonatan
Consent Type: Consent 1 (Standard)
Eye Shield Used: No
Surgeon Performing Repair (Optional): Jose
Initial Size Of Lesion: 1.9
X Size Of Lesion In Cm (Optional): 1.2
Number Of Stages: 1
Primary Defect Length In Cm (Final Defect Size - Required For Flaps/Grafts): 2.5
Primary Defect Width In Cm (Final Defect Size - Required For Flaps/Grafts): 1.6
Repair Type: Complex Repair
Oculoplastic Surgeon Procedure Text (A): After obtaining clear surgical margins the patient was sent to oculoplastics for surgical repair.  The patient understands they will receive post-surgical care and follow-up from the referring physician's office.
Otolaryngologist Procedure Text (A): After obtaining clear surgical margins the patient was sent to otolaryngology for surgical repair.  The patient understands they will receive post-surgical care and follow-up from the referring physician's office.
Plastic Surgeon Procedure Text (A): After obtaining clear surgical margins the patient was sent to plastics for surgical repair.  The patient understands they will receive post-surgical care and follow-up from the referring physician's office.
Mid-Level Procedure Text (A): After obtaining clear surgical margins the patient was sent to a mid-level provider for surgical repair.  The patient understands they will receive post-surgical care and follow-up from the mid-level provider.
Provider Procedure Text (A): After obtaining clear surgical margins the defect was repaired by another provider.
Asc Procedure Text (A): After obtaining clear surgical margins the patient was sent to an ASC for surgical repair.  The patient understands they will receive post-surgical care and follow-up from the ASC physician.
Suturegard Retention Suture: 2-0 Nylon
Retention Suture Bite Size: 3 mm
Length To Time In Minutes Device Was In Place: 10
Simple / Intermediate / Complex Repair - Final Wound Length In Cm: 4.7
Complex/Intermediate Repair Variations: Lazy S
Distance Of Undermining In Cm (Required): 1.8
Undermining Type: Entire Wound
Debridement Text: The wound edges were debrided prior to proceeding with the closure to facilitate wound healing.
Helical Rim Text: The closure involved the helical rim.
Vermilion Border Text: The closure involved the vermilion border.
Nostril Rim Text: The closure involved the nostril rim.
Retention Suture Text: Retention sutures were placed to support the closure and prevent dehiscence.
Secondary Defect Length In Cm (Required For Flaps): 0
Location Indication Override (Is Already Calculated Based On Selected Body Location): Area M
Area H Indication Text: Tumors in this location are included in Area H (eyelids, eyebrows, nose, lips, chin, ear, pre-auricular, post-auricular, temple, genitalia, hands, feet, ankles and areola).  Tissue conservation is critical in these anatomic locations.
Area M Indication Text: Tumors in this location are included in Area M (cheek, forehead, scalp, neck, jawline and pretibial skin).  Mohs surgery is indicated for tumors in these anatomic locations.
Area L Indication Text: Tumors in this location are included in Area L (trunk and extremities).  Mohs surgery is indicated for larger tumors, or tumors with aggressive histologic features, in these anatomic locations.
Tumor Debulked?: curette
Depth Of Tumor Invasion (For Histology): tumor not visualized
Perineural Invasion (For Histology - Be Specific If Possible): absent
Special Stains Stage 1 - Results: Base On Clearance Noted Above
Stage 2: Additional Anesthesia Type: 1% lidocaine with epinephrine
Staging Info: By selecting yes to the question above you will include information on AJCC 8 tumor staging in your Mohs note. Information on tumor staging will be automatically added for SCCs on the head and neck. AJCC 8 includes tumor size, tumor depth, perineural involvement and bone invasion.
Tumor Depth: Less than 6mm from granular layer and no invasion beyond the subcutaneous fat
Why Was The Change Made?: Please Select the Appropriate Response
Medical Necessity Statement: Based on my medical judgement, Mohs surgery is the most appropriate treatment for this cancer compared to other treatments.
Alternatives Discussed Intro (Do Not Add Period): I discussed alternative treatments to Mohs surgery and specifically discussed the risks and benefits of
Consent 1/Introductory Paragraph: The rationale for Mohs was explained to the patient and consent was obtained. The risks, benefits and alternatives to therapy were discussed in detail. Specifically, the risks of infection, scarring, bleeding, prolonged wound healing, incomplete removal, allergy to anesthesia, nerve injury and recurrence were addressed. Prior to the procedure, the treatment site was clearly identified and confirmed by the patient. All components of Universal Protocol/PAUSE Rule completed.
Consent 2/Introductory Paragraph: Mohs surgery was explained to the patient and consent was obtained. The risks, benefits and alternatives to therapy were discussed in detail. Specifically, the risks of infection, scarring, bleeding, prolonged wound healing, incomplete removal, allergy to anesthesia, nerve injury and recurrence were addressed. Prior to the procedure, the treatment site was clearly identified and confirmed by the patient. All components of Universal Protocol/PAUSE Rule completed.
Consent 3/Introductory Paragraph: I gave the patient a chance to ask questions they had about the procedure.  Following this I explained the Mohs procedure and consent was obtained. The risks, benefits and alternatives to therapy were discussed in detail. Specifically, the risks of infection, scarring, bleeding, prolonged wound healing, incomplete removal, allergy to anesthesia, nerve injury and recurrence were addressed. Prior to the procedure, the treatment site was clearly identified and confirmed by the patient. All components of Universal Protocol/PAUSE Rule completed.
Consent (Temporal Branch)/Introductory Paragraph: The rationale for Mohs was explained to the patient and consent was obtained. The risks, benefits and alternatives to therapy were discussed in detail. Specifically, the risks of damage to the temporal branch of the facial nerve, infection, scarring, bleeding, prolonged wound healing, incomplete removal, allergy to anesthesia, and recurrence were addressed. Prior to the procedure, the treatment site was clearly identified and confirmed by the patient. All components of Universal Protocol/PAUSE Rule completed.
Consent (Marginal Mandibular)/Introductory Paragraph: The rationale for Mohs was explained to the patient and consent was obtained. The risks, benefits and alternatives to therapy were discussed in detail. Specifically, the risks of damage to the marginal mandibular branch of the facial nerve, infection, scarring, bleeding, prolonged wound healing, incomplete removal, allergy to anesthesia, and recurrence were addressed. Prior to the procedure, the treatment site was clearly identified and confirmed by the patient. All components of Universal Protocol/PAUSE Rule completed.
Consent (Spinal Accessory)/Introductory Paragraph: The rationale for Mohs was explained to the patient and consent was obtained. The risks, benefits and alternatives to therapy were discussed in detail. Specifically, the risks of damage to the spinal accessory nerve, infection, scarring, bleeding, prolonged wound healing, incomplete removal, allergy to anesthesia, and recurrence were addressed. Prior to the procedure, the treatment site was clearly identified and confirmed by the patient. All components of Universal Protocol/PAUSE Rule completed.
Consent (Near Eyelid Margin)/Introductory Paragraph: The rationale for Mohs was explained to the patient and consent was obtained. The risks, benefits and alternatives to therapy were discussed in detail. Specifically, the risks of ectropion or eyelid deformity, infection, scarring, bleeding, prolonged wound healing, incomplete removal, allergy to anesthesia, nerve injury and recurrence were addressed. Prior to the procedure, the treatment site was clearly identified and confirmed by the patient. All components of Universal Protocol/PAUSE Rule completed.
Consent (Ear)/Introductory Paragraph: The rationale for Mohs was explained to the patient and consent was obtained. The risks, benefits and alternatives to therapy were discussed in detail. Specifically, the risks of ear deformity, infection, scarring, bleeding, prolonged wound healing, incomplete removal, allergy to anesthesia, nerve injury and recurrence were addressed. Prior to the procedure, the treatment site was clearly identified and confirmed by the patient. All components of Universal Protocol/PAUSE Rule completed.
Consent (Nose)/Introductory Paragraph: The rationale for Mohs was explained to the patient and consent was obtained. The risks, benefits and alternatives to therapy were discussed in detail. Specifically, the risks of nasal deformity, changes in the flow of air through the nose, infection, scarring, bleeding, prolonged wound healing, incomplete removal, allergy to anesthesia, nerve injury and recurrence were addressed. Prior to the procedure, the treatment site was clearly identified and confirmed by the patient. All components of Universal Protocol/PAUSE Rule completed.
Consent (Lip)/Introductory Paragraph: The rationale for Mohs was explained to the patient and consent was obtained. The risks, benefits and alternatives to therapy were discussed in detail. Specifically, the risks of lip deformity, changes in the oral aperture, infection, scarring, bleeding, prolonged wound healing, incomplete removal, allergy to anesthesia, nerve injury and recurrence were addressed. Prior to the procedure, the treatment site was clearly identified and confirmed by the patient. All components of Universal Protocol/PAUSE Rule completed.
Consent (Scalp)/Introductory Paragraph: The rationale for Mohs was explained to the patient and consent was obtained. The risks, benefits and alternatives to therapy were discussed in detail. Specifically, the risks of changes in hair growth pattern secondary to repair, infection, scarring, bleeding, prolonged wound healing, incomplete removal, allergy to anesthesia, nerve injury and recurrence were addressed. Prior to the procedure, the treatment site was clearly identified and confirmed by the patient. All components of Universal Protocol/PAUSE Rule completed.
Detail Level: Detailed
Postop Diagnosis: same
Anesthesia Type: 1% lidocaine with 1:100,000 epinephrine and a 1:10 solution of 8.4% sodium bicarbonate
Anesthesia Volume In Cc: 2.4
Hemostasis: Electrocoagulation
Estimated Blood Loss (Cc): minimal
Repair Anesthesia Method: local infiltration
Anesthesia Volume In Cc: 2.8
Undermining Location (Optional): in the superficial subcutaneous fat
Deep Sutures: 5-0 Poly([id number])
Epidermal Sutures: 5-0 Fast Absorbing Gut
Epidermal Closure: running
Suturegard Intro: Intraoperative tissue expansion was performed, utilizing the SUTUREGARD device, in order to reduce wound tension.
Suturegard Body: The suture ends were repeatedly re-tightened and re-clamped to achieve the desired tissue expansion.
Hemigard Intro: Due to skin fragility and wound tension, it was decided to use HEMIGARD adhesive retention suture devices to permit a linear closure. The skin was cleaned and dried for a 6cm distance away from the wound. Excessive hair, if present, was removed to allow for adhesion.
Hemigard Postcare Instructions: The HEMIGARD strips are to remain completely dry for at least 5-7 days.
Donor Site Anesthesia Type: same as repair anesthesia
Epidermal Closure Graft Donor Site (Optional): simple interrupted
Graft Donor Site Bandage (Optional-Leave Blank If You Don't Want In Note): Steri-strips and a pressure bandage were applied to the donor site.
Closure 2 Information: This tab is for additional flaps and grafts, including complex repair and grafts and complex repair and flaps. You can also specify a different location for the additional defect, if the location is the same you do not need to select a new one. We will insert the automated text for the repair you select below just as we do for solitary flaps and grafts. Please note that at this time if you select a location with a different insurance zone you will need to override the ICD10 and CPT if appropriate.
Closure 3 Information: This tab is for additional flaps and grafts above and beyond our usual structured repairs.  Please note if you enter information here it will not currently bill and you will need to add the billing information manually.
Wound Care: Petrolatum
Dressing: pressure dressing with telfa
Dressing (No Sutures): dry sterile dressing
Unna Boot Text: An Unna boot was placed to help immobilize the limb and facilitate more rapid healing.
Home Suture Removal Text: Patient was provided instructions on removing sutures and will remove their sutures at home.  If they have any questions or difficulties they will call the office.
Post-Care Instructions: I reviewed with the patient in detail post-care instructions. Patient is not to engage in any heavy lifting, exercise, or swimming for the next 14 days. Should the patient develop any fevers, chills, bleeding, severe pain patient will contact the office immediately.
Pain Refusal Text: I offered to prescribe pain medication but the patient refused to take this medication.
Mauc Instructions: By selecting yes to the question below the MAUC number will be added into the note.  This will be calculated automatically based on the diagnosis chosen, the size entered, the body zone selected (H,M,L) and the specific indications you chose. You will also have the option to override the Mohs AUC if you disagree with the automatically calculated number and this option is found in the Case Summary tab.
Where Do You Want The Question To Include Opioid Counseling Located?: Case Summary Tab
Eye Protection Verbiage: Before proceeding with the stage, a plastic scleral shield was inserted. The globe was anesthetized with a few drops of 1% lidocaine with 1:100,000 epinephrine. Then, an appropriate sized scleral shield was chosen and coated with lacrilube ointment. The shield was gently inserted and left in place for the duration of each stage. After the stage was completed, the shield was gently removed.
Mohs Method Verbiage: An incision at a 45 degree angle following the standard Mohs approach was done and the specimen was harvested as a microscopic controlled layer.
Surgeon/Pathologist Verbiage (Will Incorporate Name Of Surgeon From Intro If Not Blank): operated in two distinct and integrated capacities as the surgeon and pathologist.
Mohs Histo Method Verbiage: Each section was then chromacoded and processed in the Mohs lab using the Mohs protocol and submitted for frozen section.
Subsequent Stages Histo Method Verbiage: Using a similar technique to that described above, a thin layer of tissue was removed from all areas where tumor was visible on the previous stage.  The tissue was again oriented, mapped, dyed, and processed as above.
Mohs Rapid Report Verbiage: The area of clinically evident tumor was marked with skin marking ink and appropriately hatched.  The initial incision was made following the Mohs approach through the skin.  The specimen was taken to the lab, divided into the necessary number of pieces, chromacoded and processed according to the Mohs protocol.  This was repeated in successive stages until a tumor free defect was achieved.
Complex Repair Preamble Text (Leave Blank If You Do Not Want): Extensive wide undermining was performed.
Intermediate Repair Preamble Text (Leave Blank If You Do Not Want): Undermining was performed with blunt dissection.
Non-Graft Cartilage Fenestration Text: The cartilage was fenestrated with a 2mm punch biopsy to help facilitate healing.
Graft Cartilage Fenestration Text: The cartilage was fenestrated with a 2mm punch biopsy to help facilitate graft survival and healing.
Secondary Intention Text (Leave Blank If You Do Not Want): The defect will heal with secondary intention.
No Repair - Repaired With Adjacent Surgical Defect Text (Leave Blank If You Do Not Want): After obtaining clear surgical margins the defect was repaired concurrently with another surgical defect which was in close approximation.
Advancement Flap (Single) Text: The defect edges were debeveled with a #15 scalpel blade.  Given the location of the defect and the proximity to free margins a single advancement flap was deemed most appropriate.  Using a sterile surgical marker, an appropriate advancement flap was drawn incorporating the defect and placing the expected incisions within the relaxed skin tension lines where possible.    The area thus outlined was incised deep to adipose tissue with a #15 scalpel blade.  The skin margins were undermined to an appropriate distance in all directions utilizing iris scissors.
Advancement Flap (Double) Text: The defect edges were debeveled with a #15 scalpel blade.  Given the location of the defect and the proximity to free margins a double advancement flap was deemed most appropriate.  Using a sterile surgical marker, the appropriate advancement flaps were drawn incorporating the defect and placing the expected incisions within the relaxed skin tension lines where possible.    The area thus outlined was incised deep to adipose tissue with a #15 scalpel blade.  The skin margins were undermined to an appropriate distance in all directions utilizing iris scissors.
Burow's Advancement Flap Text: The defect edges were debeveled with a #15 scalpel blade.  Given the location of the defect and the proximity to free margins a Burow's advancement flap was deemed most appropriate.  Using a sterile surgical marker, the appropriate advancement flap was drawn incorporating the defect and placing the expected incisions within the relaxed skin tension lines where possible.    The area thus outlined was incised deep to adipose tissue with a #15 scalpel blade.  The skin margins were undermined to an appropriate distance in all directions utilizing iris scissors.
Chonodrocutaneous Helical Advancement Flap Text: The defect edges were debeveled with a #15 scalpel blade.  Given the location of the defect and the proximity to free margins a chondrocutaneous helical advancement flap was deemed most appropriate.  Using a sterile surgical marker, the appropriate advancement flap was drawn incorporating the defect and placing the expected incisions within the relaxed skin tension lines where possible.    The area thus outlined was incised deep to adipose tissue with a #15 scalpel blade.  The skin margins were undermined to an appropriate distance in all directions utilizing iris scissors.
Crescentic Advancement Flap Text: The defect edges were debeveled with a #15 scalpel blade.  Given the location of the defect and the proximity to free margins a crescentic advancement flap was deemed most appropriate.  Using a sterile surgical marker, the appropriate advancement flap was drawn incorporating the defect and placing the expected incisions within the relaxed skin tension lines where possible.    The area thus outlined was incised deep to adipose tissue with a #15 scalpel blade.  The skin margins were undermined to an appropriate distance in all directions utilizing iris scissors.
A-T Advancement Flap Text: The defect edges were debeveled with a #15 scalpel blade.  Given the location of the defect, shape of the defect and the proximity to free margins an A-T advancement flap was deemed most appropriate.  Using a sterile surgical marker, an appropriate advancement flap was drawn incorporating the defect and placing the expected incisions within the relaxed skin tension lines where possible.    The area thus outlined was incised deep to adipose tissue with a #15 scalpel blade.  The skin margins were undermined to an appropriate distance in all directions utilizing iris scissors.
O-T Advancement Flap Text: The defect edges were debeveled with a #15 scalpel blade.  Given the location of the defect, shape of the defect and the proximity to free margins an O-T advancement flap was deemed most appropriate.  Using a sterile surgical marker, an appropriate advancement flap was drawn incorporating the defect and placing the expected incisions within the relaxed skin tension lines where possible.    The area thus outlined was incised deep to adipose tissue with a #15 scalpel blade.  The skin margins were undermined to an appropriate distance in all directions utilizing iris scissors.
O-L Flap Text: The defect edges were debeveled with a #15 scalpel blade.  Given the location of the defect, shape of the defect and the proximity to free margins an O-L flap was deemed most appropriate.  Using a sterile surgical marker, an appropriate advancement flap was drawn incorporating the defect and placing the expected incisions within the relaxed skin tension lines where possible.    The area thus outlined was incised deep to adipose tissue with a #15 scalpel blade.  The skin margins were undermined to an appropriate distance in all directions utilizing iris scissors.
O-Z Flap Text: The defect edges were debeveled with a #15 scalpel blade.  Given the location of the defect, shape of the defect and the proximity to free margins an O-Z flap was deemed most appropriate.  Using a sterile surgical marker, an appropriate transposition flap was drawn incorporating the defect and placing the expected incisions within the relaxed skin tension lines where possible. The area thus outlined was incised deep to adipose tissue with a #15 scalpel blade.  The skin margins were undermined to an appropriate distance in all directions utilizing iris scissors.
Double O-Z Flap Text: The defect edges were debeveled with a #15 scalpel blade.  Given the location of the defect, shape of the defect and the proximity to free margins a Double O-Z flap was deemed most appropriate.  Using a sterile surgical marker, an appropriate transposition flap was drawn incorporating the defect and placing the expected incisions within the relaxed skin tension lines where possible. The area thus outlined was incised deep to adipose tissue with a #15 scalpel blade.  The skin margins were undermined to an appropriate distance in all directions utilizing iris scissors.
V-Y Flap Text: The defect edges were debeveled with a #15 scalpel blade.  Given the location of the defect, shape of the defect and the proximity to free margins a V-Y flap was deemed most appropriate.  Using a sterile surgical marker, an appropriate advancement flap was drawn incorporating the defect and placing the expected incisions within the relaxed skin tension lines where possible.    The area thus outlined was incised deep to adipose tissue with a #15 scalpel blade.  The skin margins were undermined to an appropriate distance in all directions utilizing iris scissors.
Advancement-Rotation Flap Text: The defect edges were debeveled with a #15 scalpel blade.  Given the location of the defect, shape of the defect and the proximity to free margins an advancement-rotation flap was deemed most appropriate.  Using a sterile surgical marker, an appropriate flap was drawn incorporating the defect and placing the expected incisions within the relaxed skin tension lines where possible. The area thus outlined was incised deep to adipose tissue with a #15 scalpel blade.  The skin margins were undermined to an appropriate distance in all directions utilizing iris scissors.
Mercedes Flap Text: The defect edges were debeveled with a #15 scalpel blade.  Given the location of the defect, shape of the defect and the proximity to free margins a Mercedes flap was deemed most appropriate.  Using a sterile surgical marker, an appropriate advancement flap was drawn incorporating the defect and placing the expected incisions within the relaxed skin tension lines where possible. The area thus outlined was incised deep to adipose tissue with a #15 scalpel blade.  The skin margins were undermined to an appropriate distance in all directions utilizing iris scissors.
Modified Advancement Flap Text: The defect edges were debeveled with a #15 scalpel blade.  Given the location of the defect, shape of the defect and the proximity to free margins a modified advancement flap was deemed most appropriate.  Using a sterile surgical marker, an appropriate advancement flap was drawn incorporating the defect and placing the expected incisions within the relaxed skin tension lines where possible.    The area thus outlined was incised deep to adipose tissue with a #15 scalpel blade.  The skin margins were undermined to an appropriate distance in all directions utilizing iris scissors.
Mucosal Advancement Flap Text: Given the location of the defect, shape of the defect and the proximity to free margins a mucosal advancement flap was deemed most appropriate. Incisions were made with a 15 blade scalpel in the appropriate fashion along the cutaneous vermilion border and the mucosal lip. The remaining actinically damaged mucosal tissue was excised.  The mucosal advancement flap was then elevated to the gingival sulcus with care taken to preserve the neurovascular structures and advanced into the primary defect. Care was taken to ensure that precise realignment of the vermilion border was achieved.
Peng Advancement Flap Text: The defect edges were debeveled with a #15 scalpel blade.  Given the location of the defect, shape of the defect and the proximity to free margins a Peng advancement flap was deemed most appropriate.  Using a sterile surgical marker, an appropriate advancement flap was drawn incorporating the defect and placing the expected incisions within the relaxed skin tension lines where possible. The area thus outlined was incised deep to adipose tissue with a #15 scalpel blade.  The skin margins were undermined to an appropriate distance in all directions utilizing iris scissors.
Hatchet Flap Text: The defect edges were debeveled with a #15 scalpel blade.  Given the location of the defect, shape of the defect and the proximity to free margins a hatchet flap was deemed most appropriate.  Using a sterile surgical marker, an appropriate hatchet flap was drawn incorporating the defect and placing the expected incisions within the relaxed skin tension lines where possible.    The area thus outlined was incised deep to adipose tissue with a #15 scalpel blade.  The skin margins were undermined to an appropriate distance in all directions utilizing iris scissors.
Rotation Flap Text: The defect edges were debeveled with a #15 scalpel blade.  Given the location of the defect, shape of the defect and the proximity to free margins a rotation flap was deemed most appropriate.  Using a sterile surgical marker, an appropriate rotation flap was drawn incorporating the defect and placing the expected incisions within the relaxed skin tension lines where possible.    The area thus outlined was incised deep to adipose tissue with a #15 scalpel blade.  The skin margins were undermined to an appropriate distance in all directions utilizing iris scissors.
Spiral Flap Text: The defect edges were debeveled with a #15 scalpel blade.  Given the location of the defect, shape of the defect and the proximity to free margins a spiral flap was deemed most appropriate.  Using a sterile surgical marker, an appropriate rotation flap was drawn incorporating the defect and placing the expected incisions within the relaxed skin tension lines where possible. The area thus outlined was incised deep to adipose tissue with a #15 scalpel blade.  The skin margins were undermined to an appropriate distance in all directions utilizing iris scissors.
Star Wedge Flap Text: The defect edges were debeveled with a #15 scalpel blade.  Given the location of the defect, shape of the defect and the proximity to free margins a star wedge flap was deemed most appropriate.  Using a sterile surgical marker, an appropriate rotation flap was drawn incorporating the defect and placing the expected incisions within the relaxed skin tension lines where possible. The area thus outlined was incised deep to adipose tissue with a #15 scalpel blade.  The skin margins were undermined to an appropriate distance in all directions utilizing iris scissors.
Transposition Flap Text: The defect edges were debeveled with a #15 scalpel blade.  Given the location of the defect and the proximity to free margins a transposition flap was deemed most appropriate.  Using a sterile surgical marker, an appropriate transposition flap was drawn incorporating the defect.    The area thus outlined was incised deep to adipose tissue with a #15 scalpel blade.  The skin margins were undermined to an appropriate distance in all directions utilizing iris scissors.
Muscle Hinge Flap Text: The defect edges were debeveled with a #15 scalpel blade.  Given the size, depth and location of the defect and the proximity to free margins a muscle hinge flap was deemed most appropriate.  Using a sterile surgical marker, an appropriate hinge flap was drawn incorporating the defect. The area thus outlined was incised with a #15 scalpel blade.  The skin margins were undermined to an appropriate distance in all directions utilizing iris scissors.
Melolabial Transposition Flap Text: The defect edges were debeveled with a #15 scalpel blade.  Given the location of the defect and the proximity to free margins a melolabial flap was deemed most appropriate.  Using a sterile surgical marker, an appropriate melolabial transposition flap was drawn incorporating the defect.    The area thus outlined was incised deep to adipose tissue with a #15 scalpel blade.  The skin margins were undermined to an appropriate distance in all directions utilizing iris scissors.
Rhombic Flap Text: The defect edges were debeveled with a #15 scalpel blade.  Given the location of the defect and the proximity to free margins a rhombic flap was deemed most appropriate.  Using a sterile surgical marker, an appropriate rhombic flap was drawn incorporating the defect.    The area thus outlined was incised deep to adipose tissue with a #15 scalpel blade.  The skin margins were undermined to an appropriate distance in all directions utilizing iris scissors.
Rhomboid Transposition Flap Text: The defect edges were debeveled with a #15 scalpel blade.  Given the location of the defect and the proximity to free margins a rhomboid transposition flap was deemed most appropriate.  Using a sterile surgical marker, an appropriate rhomboid flap was drawn incorporating the defect.    The area thus outlined was incised deep to adipose tissue with a #15 scalpel blade.  The skin margins were undermined to an appropriate distance in all directions utilizing iris scissors.
Bi-Rhombic Flap Text: The defect edges were debeveled with a #15 scalpel blade.  Given the location of the defect and the proximity to free margins a bi-rhombic flap was deemed most appropriate.  Using a sterile surgical marker, an appropriate rhombic flap was drawn incorporating the defect. The area thus outlined was incised deep to adipose tissue with a #15 scalpel blade.  The skin margins were undermined to an appropriate distance in all directions utilizing iris scissors.
Helical Rim Advancement Flap Text: The defect edges were debeveled with a #15 blade scalpel.  Given the location of the defect and the proximity to free margins (helical rim) a double helical rim advancement flap was deemed most appropriate.  Using a sterile surgical marker, the appropriate advancement flaps were drawn incorporating the defect and placing the expected incisions between the helical rim and antihelix where possible.  The area thus outlined was incised through and through with a #15 scalpel blade.  With a skin hook and iris scissors, the flaps were gently and sharply undermined and freed up.
Bilateral Helical Rim Advancement Flap Text: The defect edges were debeveled with a #15 blade scalpel.  Given the location of the defect and the proximity to free margins (helical rim) a bilateral helical rim advancement flap was deemed most appropriate.  Using a sterile surgical marker, the appropriate advancement flaps were drawn incorporating the defect and placing the expected incisions between the helical rim and antihelix where possible.  The area thus outlined was incised through and through with a #15 scalpel blade.  With a skin hook and iris scissors, the flaps were gently and sharply undermined and freed up.
Ear Star Wedge Flap Text: The defect edges were debeveled with a #15 blade scalpel.  Given the location of the defect and the proximity to free margins (helical rim) an ear star wedge flap was deemed most appropriate.  Using a sterile surgical marker, the appropriate flap was drawn incorporating the defect and placing the expected incisions between the helical rim and antihelix where possible.  The area thus outlined was incised through and through with a #15 scalpel blade.
Banner Transposition Flap Text: The defect edges were debeveled with a #15 scalpel blade.  Given the location of the defect and the proximity to free margins a Banner transposition flap was deemed most appropriate.  Using a sterile surgical marker, an appropriate flap drawn around the defect. The area thus outlined was incised deep to adipose tissue with a #15 scalpel blade.  The skin margins were undermined to an appropriate distance in all directions utilizing iris scissors.
Bilobed Flap Text: The defect edges were debeveled with a #15 scalpel blade.  Given the location of the defect and the proximity to free margins a bilobe flap was deemed most appropriate.  Using a sterile surgical marker, an appropriate bilobe flap drawn around the defect.    The area thus outlined was incised deep to adipose tissue with a #15 scalpel blade.  The skin margins were undermined to an appropriate distance in all directions utilizing iris scissors.
Bilobed Transposition Flap Text: The defect edges were debeveled with a #15 scalpel blade.  Given the location of the defect and the proximity to free margins a bilobed transposition flap was deemed most appropriate.  Using a sterile surgical marker, an appropriate bilobe flap drawn around the defect.    The area thus outlined was incised deep to adipose tissue with a #15 scalpel blade.  The skin margins were undermined to an appropriate distance in all directions utilizing iris scissors.
Trilobed Flap Text: The defect edges were debeveled with a #15 scalpel blade.  Given the location of the defect and the proximity to free margins a trilobed flap was deemed most appropriate.  Using a sterile surgical marker, an appropriate trilobed flap drawn around the defect.    The area thus outlined was incised deep to adipose tissue with a #15 scalpel blade.  The skin margins were undermined to an appropriate distance in all directions utilizing iris scissors.
Dorsal Nasal Flap Text: The defect edges were debeveled with a #15 scalpel blade.  Given the location of the defect and the proximity to free margins a dorsal nasal flap was deemed most appropriate.  Using a sterile surgical marker, an appropriate dorsal nasal flap was drawn around the defect.    The area thus outlined was incised deep to adipose tissue with a #15 scalpel blade.  The skin margins were undermined to an appropriate distance in all directions utilizing iris scissors.
Island Pedicle Flap Text: The defect edges were debeveled with a #15 scalpel blade.  Given the location of the defect, shape of the defect and the proximity to free margins an island pedicle advancement flap was deemed most appropriate.  Using a sterile surgical marker, an appropriate advancement flap was drawn incorporating the defect, outlining the appropriate donor tissue and placing the expected incisions within the relaxed skin tension lines where possible.    The area thus outlined was incised deep to adipose tissue with a #15 scalpel blade.  The skin margins were undermined to an appropriate distance in all directions around the primary defect and laterally outward around the island pedicle utilizing iris scissors.  There was minimal undermining beneath the pedicle flap.
Island Pedicle Flap With Canthal Suspension Text: The defect edges were debeveled with a #15 scalpel blade.  Given the location of the defect, shape of the defect and the proximity to free margins an island pedicle advancement flap was deemed most appropriate.  Using a sterile surgical marker, an appropriate advancement flap was drawn incorporating the defect, outlining the appropriate donor tissue and placing the expected incisions within the relaxed skin tension lines where possible. The area thus outlined was incised deep to adipose tissue with a #15 scalpel blade.  The skin margins were undermined to an appropriate distance in all directions around the primary defect and laterally outward around the island pedicle utilizing iris scissors.  There was minimal undermining beneath the pedicle flap. A suspension suture was placed in the canthal tendon to prevent tension and prevent ectropion.
Alar Island Pedicle Flap Text: The defect edges were debeveled with a #15 scalpel blade.  Given the location of the defect, shape of the defect and the proximity to the alar rim an island pedicle advancement flap was deemed most appropriate.  Using a sterile surgical marker, an appropriate advancement flap was drawn incorporating the defect, outlining the appropriate donor tissue and placing the expected incisions within the nasal ala running parallel to the alar rim. The area thus outlined was incised with a #15 scalpel blade.  The skin margins were undermined minimally to an appropriate distance in all directions around the primary defect and laterally outward around the island pedicle utilizing iris scissors.  There was minimal undermining beneath the pedicle flap.
Double Island Pedicle Flap Text: The defect edges were debeveled with a #15 scalpel blade.  Given the location of the defect, shape of the defect and the proximity to free margins a double island pedicle advancement flap was deemed most appropriate.  Using a sterile surgical marker, an appropriate advancement flap was drawn incorporating the defect, outlining the appropriate donor tissue and placing the expected incisions within the relaxed skin tension lines where possible.    The area thus outlined was incised deep to adipose tissue with a #15 scalpel blade.  The skin margins were undermined to an appropriate distance in all directions around the primary defect and laterally outward around the island pedicle utilizing iris scissors.  There was minimal undermining beneath the pedicle flap.
Island Pedicle Flap-Requiring Vessel Identification Text: The defect edges were debeveled with a #15 scalpel blade.  Given the location of the defect, shape of the defect and the proximity to free margins an island pedicle advancement flap was deemed most appropriate.  Using a sterile surgical marker, an appropriate advancement flap was drawn, based on the axial vessel mentioned above, incorporating the defect, outlining the appropriate donor tissue and placing the expected incisions within the relaxed skin tension lines where possible.    The area thus outlined was incised deep to adipose tissue with a #15 scalpel blade.  The skin margins were undermined to an appropriate distance in all directions around the primary defect and laterally outward around the island pedicle utilizing iris scissors.  There was minimal undermining beneath the pedicle flap.
Keystone Flap Text: The defect edges were debeveled with a #15 scalpel blade.  Given the location of the defect, shape of the defect a keystone flap was deemed most appropriate.  Using a sterile surgical marker, an appropriate keystone flap was drawn incorporating the defect, outlining the appropriate donor tissue and placing the expected incisions within the relaxed skin tension lines where possible. The area thus outlined was incised deep to adipose tissue with a #15 scalpel blade.  The skin margins were undermined to an appropriate distance in all directions around the primary defect and laterally outward around the flap utilizing iris scissors.
O-T Plasty Text: The defect edges were debeveled with a #15 scalpel blade.  Given the location of the defect, shape of the defect and the proximity to free margins an O-T plasty was deemed most appropriate.  Using a sterile surgical marker, an appropriate O-T plasty was drawn incorporating the defect and placing the expected incisions within the relaxed skin tension lines where possible.    The area thus outlined was incised deep to adipose tissue with a #15 scalpel blade.  The skin margins were undermined to an appropriate distance in all directions utilizing iris scissors.
O-Z Plasty Text: The defect edges were debeveled with a #15 scalpel blade.  Given the location of the defect, shape of the defect and the proximity to free margins an O-Z plasty (double transposition flap) was deemed most appropriate.  Using a sterile surgical marker, the appropriate transposition flaps were drawn incorporating the defect and placing the expected incisions within the relaxed skin tension lines where possible.    The area thus outlined was incised deep to adipose tissue with a #15 scalpel blade.  The skin margins were undermined to an appropriate distance in all directions utilizing iris scissors.  Hemostasis was achieved with electrocautery.  The flaps were then transposed into place, one clockwise and the other counterclockwise, and anchored with interrupted buried subcutaneous sutures.
Double O-Z Plasty Text: The defect edges were debeveled with a #15 scalpel blade.  Given the location of the defect, shape of the defect and the proximity to free margins a Double O-Z plasty (double transposition flap) was deemed most appropriate.  Using a sterile surgical marker, the appropriate transposition flaps were drawn incorporating the defect and placing the expected incisions within the relaxed skin tension lines where possible. The area thus outlined was incised deep to adipose tissue with a #15 scalpel blade.  The skin margins were undermined to an appropriate distance in all directions utilizing iris scissors.  Hemostasis was achieved with electrocautery.  The flaps were then transposed into place, one clockwise and the other counterclockwise, and anchored with interrupted buried subcutaneous sutures.
V-Y Plasty Text: The defect edges were debeveled with a #15 scalpel blade.  Given the location of the defect, shape of the defect and the proximity to free margins an V-Y advancement flap was deemed most appropriate.  Using a sterile surgical marker, an appropriate advancement flap was drawn incorporating the defect and placing the expected incisions within the relaxed skin tension lines where possible.    The area thus outlined was incised deep to adipose tissue with a #15 scalpel blade.  The skin margins were undermined to an appropriate distance in all directions utilizing iris scissors.
H Plasty Text: Given the location of the defect, shape of the defect and the proximity to free margins a H-plasty was deemed most appropriate for repair.  Using a sterile surgical marker, the appropriate advancement arms of the H-plasty were drawn incorporating the defect and placing the expected incisions within the relaxed skin tension lines where possible. The area thus outlined was incised deep to adipose tissue with a #15 scalpel blade. The skin margins were undermined to an appropriate distance in all directions utilizing iris scissors.  The opposing advancement arms were then advanced into place in opposite direction and anchored with interrupted buried subcutaneous sutures.
W Plasty Text: The lesion was extirpated to the level of the fat with a #15 scalpel blade.  Given the location of the defect, shape of the defect and the proximity to free margins a W-plasty was deemed most appropriate for repair.  Using a sterile surgical marker, the appropriate transposition arms of the W-plasty were drawn incorporating the defect and placing the expected incisions within the relaxed skin tension lines where possible.    The area thus outlined was incised deep to adipose tissue with a #15 scalpel blade.  The skin margins were undermined to an appropriate distance in all directions utilizing iris scissors.  The opposing transposition arms were then transposed into place in opposite direction and anchored with interrupted buried subcutaneous sutures.
Z Plasty Text: The lesion was extirpated to the level of the fat with a #15 scalpel blade.  Given the location of the defect, shape of the defect and the proximity to free margins a Z-plasty was deemed most appropriate for repair.  Using a sterile surgical marker, the appropriate transposition arms of the Z-plasty were drawn incorporating the defect and placing the expected incisions within the relaxed skin tension lines where possible.    The area thus outlined was incised deep to adipose tissue with a #15 scalpel blade.  The skin margins were undermined to an appropriate distance in all directions utilizing iris scissors.  The opposing transposition arms were then transposed into place in opposite direction and anchored with interrupted buried subcutaneous sutures.
Zygomaticofacial Flap Text: Given the location of the defect, shape of the defect and the proximity to free margins a zygomaticofacial flap was deemed most appropriate for repair.  Using a sterile surgical marker, the appropriate flap was drawn incorporating the defect and placing the expected incisions within the relaxed skin tension lines where possible. The area thus outlined was incised deep to adipose tissue with a #15 scalpel blade with preservation of a vascular pedicle.  The skin margins were undermined to an appropriate distance in all directions utilizing iris scissors.  The flap was then placed into the defect and anchored with interrupted buried subcutaneous sutures.
Cheek Interpolation Flap Text: A decision was made to reconstruct the defect utilizing an interpolation axial flap and a staged reconstruction.  A telfa template was made of the defect.  This telfa template was then used to outline the Cheek Interpolation flap.  The donor area for the pedicle flap was then injected with anesthesia.  The flap was excised through the skin and subcutaneous tissue down to the layer of the underlying musculature.  The interpolation flap was carefully excised within this deep plane to maintain its blood supply.  The edges of the donor site were undermined.   The donor site was closed in a primary fashion.  The pedicle was then rotated into position and sutured.  Once the tube was sutured into place, adequate blood supply was confirmed with blanching and refill.  The pedicle was then wrapped with xeroform gauze and dressed appropriately with a telfa and gauze bandage to ensure continued blood supply and protect the attached pedicle.
Cheek-To-Nose Interpolation Flap Text: A decision was made to reconstruct the defect utilizing an interpolation axial flap and a staged reconstruction.  A telfa template was made of the defect.  This telfa template was then used to outline the Cheek-To-Nose Interpolation flap.  The donor area for the pedicle flap was then injected with anesthesia.  The flap was excised through the skin and subcutaneous tissue down to the layer of the underlying musculature.  The interpolation flap was carefully excised within this deep plane to maintain its blood supply.  The edges of the donor site were undermined.   The donor site was closed in a primary fashion.  The pedicle was then rotated into position and sutured.  Once the tube was sutured into place, adequate blood supply was confirmed with blanching and refill.  The pedicle was then wrapped with xeroform gauze and dressed appropriately with a telfa and gauze bandage to ensure continued blood supply and protect the attached pedicle.
Interpolation Flap Text: A decision was made to reconstruct the defect utilizing an interpolation axial flap and a staged reconstruction.  A telfa template was made of the defect.  This telfa template was then used to outline the interpolation flap.  The donor area for the pedicle flap was then injected with anesthesia.  The flap was excised through the skin and subcutaneous tissue down to the layer of the underlying musculature.  The interpolation flap was carefully excised within this deep plane to maintain its blood supply.  The edges of the donor site were undermined.   The donor site was closed in a primary fashion.  The pedicle was then rotated into position and sutured.  Once the tube was sutured into place, adequate blood supply was confirmed with blanching and refill.  The pedicle was then wrapped with xeroform gauze and dressed appropriately with a telfa and gauze bandage to ensure continued blood supply and protect the attached pedicle.
Melolabial Interpolation Flap Text: A decision was made to reconstruct the defect utilizing an interpolation axial flap and a staged reconstruction.  A telfa template was made of the defect.  This telfa template was then used to outline the melolabial interpolation flap.  The donor area for the pedicle flap was then injected with anesthesia.  The flap was excised through the skin and subcutaneous tissue down to the layer of the underlying musculature.  The pedicle flap was carefully excised within this deep plane to maintain its blood supply.  The edges of the donor site were undermined.   The donor site was closed in a primary fashion.  The pedicle was then rotated into position and sutured.  Once the tube was sutured into place, adequate blood supply was confirmed with blanching and refill.  The pedicle was then wrapped with xeroform gauze and dressed appropriately with a telfa and gauze bandage to ensure continued blood supply and protect the attached pedicle.
Mastoid Interpolation Flap Text: A decision was made to reconstruct the defect utilizing an interpolation axial flap and a staged reconstruction.  A telfa template was made of the defect.  This telfa template was then used to outline the mastoid interpolation flap.  The donor area for the pedicle flap was then injected with anesthesia.  The flap was excised through the skin and subcutaneous tissue down to the layer of the underlying musculature.  The pedicle flap was carefully excised within this deep plane to maintain its blood supply.  The edges of the donor site were undermined.   The donor site was closed in a primary fashion.  The pedicle was then rotated into position and sutured.  Once the tube was sutured into place, adequate blood supply was confirmed with blanching and refill.  The pedicle was then wrapped with xeroform gauze and dressed appropriately with a telfa and gauze bandage to ensure continued blood supply and protect the attached pedicle.
Posterior Auricular Interpolation Flap Text: A decision was made to reconstruct the defect utilizing an interpolation axial flap and a staged reconstruction.  A telfa template was made of the defect.  This telfa template was then used to outline the posterior auricular interpolation flap.  The donor area for the pedicle flap was then injected with anesthesia.  The flap was excised through the skin and subcutaneous tissue down to the layer of the underlying musculature.  The pedicle flap was carefully excised within this deep plane to maintain its blood supply.  The edges of the donor site were undermined.   The donor site was closed in a primary fashion.  The pedicle was then rotated into position and sutured.  Once the tube was sutured into place, adequate blood supply was confirmed with blanching and refill.  The pedicle was then wrapped with xeroform gauze and dressed appropriately with a telfa and gauze bandage to ensure continued blood supply and protect the attached pedicle.
Paramedian Forehead Flap Text: A decision was made to reconstruct the defect utilizing an interpolation axial flap and a staged reconstruction.  A telfa template was made of the defect.  This telfa template was then used to outline the paramedian forehead pedicle flap.  The donor area for the pedicle flap was then injected with anesthesia.  The flap was excised through the skin and subcutaneous tissue down to the layer of the underlying musculature.  The pedicle flap was carefully excised within this deep plane to maintain its blood supply.  The edges of the donor site were undermined.   The donor site was closed in a primary fashion.  The pedicle was then rotated into position and sutured.  Once the tube was sutured into place, adequate blood supply was confirmed with blanching and refill.  The pedicle was then wrapped with xeroform gauze and dressed appropriately with a telfa and gauze bandage to ensure continued blood supply and protect the attached pedicle.
Cheiloplasty (Less Than 50%) Text: A decision was made to reconstruct the defect with a  cheiloplasty.  The defect was undermined extensively.  Additional obicularis oris muscle was excised with a 15 blade scalpel.  The defect was converted into a full thickness wedge, of less than 50% of the vertical height of the lip, to facilite a better cosmetic result.  Small vessels were then tied off with 5-0 monocyrl. The obicularis oris, superficial fascia, adipose and dermis were then reapproximated.  After the deeper layers were approximated the epidermis was reapproximated with particular care given to realign the vermilion border.
Cheiloplasty (Complex) Text: A decision was made to reconstruct the defect with a  cheiloplasty.  The defect was undermined extensively.  Additional obicularis oris muscle was excised with a 15 blade scalpel.  The defect was converted into a full thickness wedge to facilite a better cosmetic result.  Small vessels were then tied off with 5-0 monocyrl. The obicularis oris, superficial fascia, adipose and dermis were then reapproximated.  After the deeper layers were approximated the epidermis was reapproximated with particular care given to realign the vermilion border.
Ear Wedge Repair Text: A wedge excision was completed by carrying down an excision through the full thickness of the ear and cartilage with an inward facing Burow's triangle. The wound was then closed in a layered fashion.
Full Thickness Lip Wedge Repair (Flap) Text: Given the location of the defect and the proximity to free margins a full thickness wedge repair was deemed most appropriate.  Using a sterile surgical marker, the appropriate repair was drawn incorporating the defect and placing the expected incisions perpendicular to the vermilion border.  The vermilion border was also meticulously outlined to ensure appropriate reapproximation during the repair.  The area thus outlined was incised through and through with a #15 scalpel blade.  The muscularis and dermis were reaproximated with deep sutures following hemostasis. Care was taken to realign the vermilion border before proceeding with the superficial closure.  Once the vermilion was realigned the superfical and mucosal closure was finished.
Ftsg Text: The defect edges were debeveled with a #15 scalpel blade.  Given the location of the defect, shape of the defect and the proximity to free margins a full thickness skin graft was deemed most appropriate.  Using a sterile surgical marker, the primary defect shape was transferred to the donor site. The area thus outlined was incised deep to adipose tissue with a #15 scalpel blade.  The harvested graft was then trimmed of adipose tissue until only dermis and epidermis was left.  The skin margins of the secondary defect were undermined to an appropriate distance in all directions utilizing iris scissors.  The secondary defect was closed with interrupted buried subcutaneous sutures.  The skin edges were then re-apposed with running  sutures.  The skin graft was then placed in the primary defect and oriented appropriately.
Split-Thickness Skin Graft Text: The defect edges were debeveled with a #15 scalpel blade.  Given the location of the defect, shape of the defect and the proximity to free margins a split thickness skin graft was deemed most appropriate.  Using a sterile surgical marker, the primary defect shape was transferred to the donor site. The split thickness graft was then harvested.  The skin graft was then placed in the primary defect and oriented appropriately.
Burow's Graft Text: The defect edges were debeveled with a #15 scalpel blade.  Given the location of the defect, shape of the defect, the proximity to free margins and the presence of a standing cone deformity a Burow's skin graft was deemed most appropriate. The standing cone was removed and this tissue was then trimmed to the shape of the primary defect. The adipose tissue was also removed until only dermis and epidermis were left.  The skin margins of the secondary defect were undermined to an appropriate distance in all directions utilizing iris scissors.  The secondary defect was closed with interrupted buried subcutaneous sutures.  The skin edges were then re-apposed with running  sutures.  The skin graft was then placed in the primary defect and oriented appropriately.
Cartilage Graft Text: The defect edges were debeveled with a #15 scalpel blade.  Given the location of the defect, shape of the defect, the fact the defect involved a full thickness cartilage defect a cartilage graft was deemed most appropriate.  An appropriate donor site was identified, cleansed, and anesthetized. The cartilage graft was then harvested and transferred to the recipient site, oriented appropriately and then sutured into place.  The secondary defect was then repaired using a primary closure.
Composite Graft Text: The defect edges were debeveled with a #15 scalpel blade.  Given the location of the defect, shape of the defect, the proximity to free margins and the fact the defect was full thickness a composite graft was deemed most appropriate.  The defect was outline and then transferred to the donor site.  A full thickness graft was then excised from the donor site. The graft was then placed in the primary defect, oriented appropriately and then sutured into place.  The secondary defect was then repaired using a primary closure.
Epidermal Autograft Text: The defect edges were debeveled with a #15 scalpel blade.  Given the location of the defect, shape of the defect and the proximity to free margins an epidermal autograft was deemed most appropriate.  Using a sterile surgical marker, the primary defect shape was transferred to the donor site. The epidermal graft was then harvested.  The skin graft was then placed in the primary defect and oriented appropriately.
Dermal Autograft Text: The defect edges were debeveled with a #15 scalpel blade.  Given the location of the defect, shape of the defect and the proximity to free margins a dermal autograft was deemed most appropriate.  Using a sterile surgical marker, the primary defect shape was transferred to the donor site. The area thus outlined was incised deep to adipose tissue with a #15 scalpel blade.  The harvested graft was then trimmed of adipose and epidermal tissue until only dermis was left.  The skin graft was then placed in the primary defect and oriented appropriately.
Skin Substitute Text: The defect edges were debeveled with a #15 scalpel blade.  Given the location of the defect, shape of the defect and the proximity to free margins a skin substitute graft was deemed most appropriate.  The graft material was trimmed to fit the size of the defect. The graft was then placed in the primary defect and oriented appropriately.
Tissue Cultured Epidermal Autograft Text: The defect edges were debeveled with a #15 scalpel blade.  Given the location of the defect, shape of the defect and the proximity to free margins a tissue cultured epidermal autograft was deemed most appropriate.  The graft was then trimmed to fit the size of the defect.  The graft was then placed in the primary defect and oriented appropriately.
Xenograft Text: The defect edges were debeveled with a #15 scalpel blade.  Given the location of the defect, shape of the defect and the proximity to free margins a xenograft was deemed most appropriate.  The graft was then trimmed to fit the size of the defect.  The graft was then placed in the primary defect and oriented appropriately.
Purse String (Simple) Text: Given the location of the defect and the characteristics of the surrounding skin a purse string closure was deemed most appropriate.  Undermining was performed circumfirentially around the surgical defect.  A purse string suture was then placed and tightened.
Purse String (Intermediate) Text: Given the location of the defect and the characteristics of the surrounding skin a purse string intermediate closure was deemed most appropriate.  Undermining was performed circumfirentially around the surgical defect.  A purse string suture was then placed and tightened.
Partial Purse String (Simple) Text: Given the location of the defect and the characteristics of the surrounding skin a simple purse string closure was deemed most appropriate.  Undermining was performed circumfirentially around the surgical defect.  A purse string suture was then placed and tightened. Wound tension only allowed a partial closure of the circular defect.
Partial Purse String (Intermediate) Text: Given the location of the defect and the characteristics of the surrounding skin an intermediate purse string closure was deemed most appropriate.  Undermining was performed circumfirentially around the surgical defect.  A purse string suture was then placed and tightened. Wound tension only allowed a partial closure of the circular defect.
Localized Dermabrasion With Wire Brush Text: The patient was draped in routine manner.  Localized dermabrasion using 3 x 17 mm wire brush was performed in routine manner to papillary dermis. This spot dermabrasion is being performed to complete skin cancer reconstruction. It also will eliminate the other sun damaged precancerous cells that are known to be part of the regional effect of a lifetime's worth of sun exposure. This localized dermabrasion is therapeutic and should not be considered cosmetic in any regard.
Tarsorrhaphy Text: A tarsorrhaphy was performed using Frost sutures.
Complex Repair And Flap Additional Text (Will Appearing After The Standard Complex Repair Text): The complex repair was not sufficient to completely close the primary defect. The remaining additional defect was repaired with the flap mentioned below.
Complex Repair And Graft Additional Text (Will Appearing After The Standard Complex Repair Text): The complex repair was not sufficient to completely close the primary defect. The remaining additional defect was repaired with the graft mentioned below.
Manual Repair Warning Statement: We plan on removing the manually selected variable below in favor of our much easier automatic structured text blocks found in the previous tab. We decided to do this to help make the flow better and give you the full power of structured data. Manual selection is never going to be ideal in our platform and I would encourage you to avoid using manual selection from this point on, especially since I will be sunsetting this feature. It is important that you do one of two things with the customized text below. First, you can save all of the text in a word file so you can have it for future reference. Second, transfer the text to the appropriate area in the Library tab. Lastly, if there is a flap or graft type which we do not have you need to let us know right away so I can add it in before the variable is hidden. No need to panic, we plan to give you roughly 6 months to make the change.
Same Histology In Subsequent Stages Text: The pattern and morphology of the tumor is as described in the first stage.
No Residual Tumor Seen Histology Text: There were no malignant cells seen in the sections examined.
Inflammation Suggestive Of Cancer Camouflage Histology Text: There was a dense lymphocytic infiltrate which prevented adequate histologic evaluation of adjacent structures.
Bcc Histology Text: There were numerous aggregates of basaloid cells.
Bcc Infiltrative Histology Text: There were numerous aggregates of basaloid cells demonstrating an infiltrative pattern.
Mart-1 - Positive Histology Text: MART-1 staining demonstrates areas of higher density and clustering of melanocytes with Pagetoid spread upwards within the epidermis. The surgical margins are positive for tumor cells.
Mart-1 - Negative Histology Text: MART-1 staining demonstrates a normal density and pattern of melanocytes along the dermal-epidermal junction. The surgical margins are negative for tumor cells.
Information: Selecting Yes will display possible errors in your note based on the variables you have selected. This validation is only offered as a suggestion for you. PLEASE NOTE THAT THE VALIDATION TEXT WILL BE REMOVED WHEN YOU FINALIZE YOUR NOTE. IF YOU WANT TO FAX A PRELIMINARY NOTE YOU WILL NEED TO TOGGLE THIS TO 'NO' IF YOU DO NOT WANT IT IN YOUR FAXED NOTE.